# Patient Record
Sex: MALE | Race: WHITE | NOT HISPANIC OR LATINO | Employment: OTHER
[De-identification: names, ages, dates, MRNs, and addresses within clinical notes are randomized per-mention and may not be internally consistent; named-entity substitution may affect disease eponyms.]

---

## 2022-02-23 ENCOUNTER — TRANSCRIBE ORDERS (OUTPATIENT)
Dept: OTHER | Age: 72
End: 2022-02-23

## 2022-02-23 DIAGNOSIS — R33.9 RETENTION OF URINE, UNSPECIFIED: Primary | ICD-10-CM

## 2022-03-01 ENCOUNTER — PRE VISIT (OUTPATIENT)
Dept: UROLOGY | Facility: CLINIC | Age: 72
End: 2022-03-01

## 2022-03-01 NOTE — TELEPHONE ENCOUNTER
MEDICAL RECORDS REQUEST   [facility]  Urology Clinic  [address] [place] [address]  PHONE: [phone]  Fax: [phone]        FUTURE VISIT INFORMATION                                                   Dale Wright, : 1950 scheduled for future visit at [facility]    APPOINTMENT INFORMATION:    Date: 2022    Provider:  Zandra Smiley PA    Reason for Visit/Diagnosis: Retention of urine, unspecified [R33.9]    REFERRAL INFORMATION:    Referring provider:  Amari Pryor    Referring providers clinic:  [facility]    Clinic contact number: Ref fax: [phone]    RECORDS REQUESTED FOR VISIT                                                     NOTES  STATUS/DETAILS   OFFICE NOTE from referring provider Media [facility], under Business/Insurance/ Care Coordination 02/15/2022, 2022 -- Amari Pryor W -- [facility]   MEDICATION LIST  yes, [facility], [facility]   LABS     IMAGING (IMAGES & REPORT)  yes, 02/10/2022 -- MR PELVIS    2021 -- CT ABD/P         PRE-VISIT CHECKLIST      Record collection complete Yes   Appointment appropriately scheduled           (right time/right provider) Yes   Joint diagnostic appointment coordinated correctly          (ensure right order & amount of time) Yes   MyChart activation No   Questionnaire complete If no, please explain pending

## 2022-03-02 ENCOUNTER — PRE VISIT (OUTPATIENT)
Dept: UROLOGY | Facility: CLINIC | Age: 72
End: 2022-03-02

## 2022-03-02 ENCOUNTER — VIRTUAL VISIT (OUTPATIENT)
Dept: UROLOGY | Facility: CLINIC | Age: 72
End: 2022-03-02
Attending: PHYSICIAN ASSISTANT

## 2022-03-02 DIAGNOSIS — Z91.199 NO-SHOW FOR APPOINTMENT: Primary | ICD-10-CM

## 2022-03-02 NOTE — CONFIDENTIAL NOTE
Reason for visit: Consult ([facility]      Relevant information: Urinary retention    Records/imaging/labs/orders: Need more outside records    Pt called: N/A    At Rooming: Video    Action 03/02/2022 JTV 4:06pm   Action Taken Patient records are in Media [facility], scanned on 02/23/2022, under document type: Business/Insurance/Care Coordinator, Doc Description [facility] Medical Doc.

## 2022-03-04 ENCOUNTER — VIRTUAL VISIT (OUTPATIENT)
Dept: UROLOGY | Facility: CLINIC | Age: 72
End: 2022-03-04
Payer: COMMERCIAL

## 2022-03-04 DIAGNOSIS — R33.9 URINARY RETENTION: ICD-10-CM

## 2022-03-04 DIAGNOSIS — N13.8 BENIGN PROSTATIC HYPERPLASIA WITH URINARY OBSTRUCTION: Primary | ICD-10-CM

## 2022-03-04 DIAGNOSIS — N13.30 HYDRONEPHROSIS, UNSPECIFIED HYDRONEPHROSIS TYPE: ICD-10-CM

## 2022-03-04 DIAGNOSIS — N40.1 BENIGN PROSTATIC HYPERPLASIA WITH URINARY OBSTRUCTION: Primary | ICD-10-CM

## 2022-03-04 PROBLEM — R33.8 ACUTE RETENTION OF URINE: Status: ACTIVE | Noted: 2022-03-04

## 2022-03-04 PROBLEM — H16.009 CORNEAL ULCER: Status: ACTIVE | Noted: 2022-03-04

## 2022-03-04 PROBLEM — E83.110 HEREDITARY HEMOCHROMATOSIS (H): Status: ACTIVE | Noted: 2022-03-04

## 2022-03-04 PROBLEM — H40.9 GLAUCOMA: Status: ACTIVE | Noted: 2022-03-04

## 2022-03-04 PROBLEM — G47.33 OBSTRUCTIVE SLEEP APNEA SYNDROME IN ADULT: Status: ACTIVE | Noted: 2022-03-04

## 2022-03-04 PROBLEM — F10.10 ALCOHOL ABUSE: Status: ACTIVE | Noted: 2022-03-04

## 2022-03-04 PROBLEM — I10 BENIGN ESSENTIAL HYPERTENSION: Status: ACTIVE | Noted: 2022-03-04

## 2022-03-04 PROBLEM — R60.0 LOWER LEG EDEMA: Status: ACTIVE | Noted: 2022-03-04

## 2022-03-04 PROBLEM — E66.9 OBESITY: Status: ACTIVE | Noted: 2022-03-04

## 2022-03-04 PROBLEM — E11.9 DIABETES MELLITUS (H): Status: ACTIVE | Noted: 2022-03-04

## 2022-03-04 PROBLEM — N17.9 ACUTE RENAL FAILURE (H): Status: ACTIVE | Noted: 2022-03-04

## 2022-03-04 PROBLEM — R97.20 ELEVATED PSA: Status: ACTIVE | Noted: 2022-03-04

## 2022-03-04 PROBLEM — F43.10 POSTTRAUMATIC STRESS DISORDER: Status: ACTIVE | Noted: 2022-03-04

## 2022-03-04 PROBLEM — E78.5 HYPERLIPIDEMIA: Status: ACTIVE | Noted: 2022-03-04

## 2022-03-04 PROCEDURE — 99214 OFFICE O/P EST MOD 30 MIN: CPT | Mod: GT | Performed by: PHYSICIAN ASSISTANT

## 2022-03-04 RX ORDER — TRIAMCINOLONE ACETONIDE 1 MG/G
CREAM TOPICAL
COMMUNITY
Start: 2021-09-27

## 2022-03-04 RX ORDER — ACYCLOVIR 800 MG/1
400 TABLET ORAL 2 TIMES DAILY
COMMUNITY
Start: 2021-12-01

## 2022-03-04 RX ORDER — LOSARTAN POTASSIUM 100 MG/1
1 TABLET ORAL DAILY
COMMUNITY
Start: 2022-01-19

## 2022-03-04 RX ORDER — CITALOPRAM HYDROBROMIDE 40 MG/1
1 TABLET ORAL EVERY MORNING
COMMUNITY
Start: 2021-09-14

## 2022-03-04 RX ORDER — MIRTAZAPINE 30 MG/1
TABLET, FILM COATED ORAL
COMMUNITY
Start: 2021-03-03

## 2022-03-04 RX ORDER — GLIPIZIDE 5 MG/1
15 TABLET ORAL
COMMUNITY
Start: 2021-12-13

## 2022-03-04 RX ORDER — ATORVASTATIN CALCIUM 20 MG/1
TABLET, FILM COATED ORAL
COMMUNITY
Start: 2021-09-27

## 2022-03-04 RX ORDER — TAMSULOSIN HYDROCHLORIDE 0.4 MG/1
0.4 CAPSULE ORAL DAILY
COMMUNITY
Start: 2022-02-09 | End: 2022-12-13

## 2022-03-04 RX ORDER — CHLORTHALIDONE 25 MG/1
TABLET ORAL
Status: ON HOLD | COMMUNITY
Start: 2021-09-27 | End: 2022-08-30

## 2022-03-04 RX ORDER — FOLIC ACID 1 MG/1
1 TABLET ORAL DAILY
COMMUNITY
Start: 2021-08-18

## 2022-03-04 RX ORDER — NIFEDIPINE 90 MG/1
1 TABLET, FILM COATED, EXTENDED RELEASE ORAL DAILY
COMMUNITY
Start: 2021-12-15

## 2022-03-04 RX ORDER — POTASSIUM CHLORIDE 750 MG/1
1 TABLET, EXTENDED RELEASE ORAL DAILY
COMMUNITY
Start: 2021-09-27

## 2022-03-04 RX ORDER — FLUOCINOLONE ACETONIDE 0.1 MG/ML
SOLUTION TOPICAL
COMMUNITY
Start: 2021-09-27

## 2022-03-04 NOTE — PROGRESS NOTES
Dale is a 71 year old who is being evaluated via a billable video visit.      How would you like to obtain your AVS? Mail a copy  If the video visit is dropped, the invitation should be resent by: Text to cell phone: [phone]  Will anyone else be joining your video visit? No      Video Start Time: 8:41 AM  Video-Visit Details    Type of service:  Video Visit    Video End Time:8:52 AM    Originating Location (pt. Location): Home    Distant Location (provider location):  [facility] UROLOGY [facility]     Platform used for Video Visit: [employer]

## 2022-03-04 NOTE — LETTER
Date:March 4, 2022      Patient was self referred, no letter generated. Do not send.        [facility] Health Information

## 2022-03-04 NOTE — PROGRESS NOTES
Chief Complaint:   Urinary retention          History of Present Illness:   Dale Wright is a 71 year old male with a history of BPH who presents for evaluation of urinary retention.  [facility] with most recent office visit 2/3/2022 with Dr. Pryor.  Patient has a history of urinary retention for which he has required catheterization beginning in 8/2021 with associated hydronephrosis and SAMANTHA.  Patient was performing CIC, but this was subsequently switched back to indwelling decker catheter beginning in 12/2021 after CT abdomen pelvis showed worsening moderate bilateral hydronephrosis with CIC.  Patient continues to have a decker catheter in place with last change 2/13/2022; he is being set up for [facility]  Most recent creatinine 2.3 from 12/27/2021.  He is maintained on tamsulosin.      Recent evaluation with UDS from 1/27/2022 showing obstructive pattern with Pdet Qmax 58 with .  Normal capacity, compliant bladder, no DO/DOI.      Patient also with elevated PSA with PSA 17.86ug/L from 8/17/2021, though decreased back to 11.07ug/L from 12/13/2021.  Recent prostate MRI from 2/10/2022 showing PIRADS 2 with prostate size 212 ml.  Of note, the patient 3 prior TRUS prostate biopsies in the past which were all negative, with the last one ~15 years ago.  He was subsequently referred to community care for evaluation of bladder outlet procedures.            Past Medical History:     Past Medical History:   Diagnosis Date     Arthritis      Corneal ulcer of left eye      Hypertension      Type 2 diabetes mellitus without complications (H)      Unspecified nonsenile cataract     OS            Past Surgical History:     Past Surgical History:   Procedure Laterality Date     C ANESTH,CORNEAL TRANSPLANT  4/16/2013    OS            Medications     Current Outpatient Medications   Medication     acyclovir (ZOVIRAX) 800 MG tablet      aspirin (ASA) 81 MG EC tablet     atorvastatin (LIPITOR) 20 MG tablet     chlorthalidone (HYGROTON) 25 MG tablet     citalopram (CELEXA) 40 MG tablet     dorzolamide-timolol (COSOPT) 2-0.5 % ophthalmic solution     empagliflozin (JARDIANCE) 25 MG TABS tablet     fluocinolone (SYNALAR) 0.01 % solution     folic acid (FOLVITE) 1 MG tablet     glipiZIDE (GLUCOTROL) 5 MG tablet     losartan (COZAAR) 100 MG tablet     mirtazapine (REMERON) 30 MG tablet     NIFEdipine ER (ADALAT CC) 90 MG 24 hr tablet     potassium chloride ER (K-TAB/KLOR-CON) 10 MEQ CR tablet     prednisoLONE acetate (PRED FORTE) 1 % ophthalmic suspension     sterile water, bottle, irrigation     tamsulosin (FLOMAX) 0.4 MG capsule     triamcinolone (KENALOG) 0.1 % external cream     No current facility-administered medications for this visit.            Family History:     Family History   Problem Relation Age of Onset     Hypertension Father      Diabetes Father      Cancer Brother      Glaucoma No family hx of      Macular Degeneration No family hx of             Social History:     Social History     Socioeconomic History     Marital status:      Spouse name: Not on file     Number of children: Not on file     Years of education: Not on file     Highest education level: Not on file   Occupational History     Not on file   Tobacco Use     Smoking status: Never Smoker     Smokeless tobacco: Never Used   Substance and Sexual Activity     Alcohol use: Not on file     Drug use: Not on file     Sexual activity: Not on file   Other Topics Concern     Not on file   Social History Narrative     Not on file     Social Determinants of Health     Financial Resource Strain: Not on file   Food Insecurity: Not on file   Transportation Needs: Not on file   Physical Activity: Not on file   Stress: Not on file   Social Connections: Not on file   Intimate Partner Violence: Not on file   Housing Stability: Not on file            Allergies:   Simvastatin          Review of Systems:  From intake questionnaire   Negative 14 system review except as noted on HPI, nurse's note.         Physical Exam:   Patient is a 71 year old  male    General Appearance Adult: Alert, no acute distress, oriented  Lungs: no respiratory distress, or pursed lip breathing  Heart: No obvious jugular venous distension present  Skin: no suspicious lesions or rashes  Neuro: Alert, oriented, speech and mentation normal  Further examination is deferred due to the nature of our visit.        Labs and Pathology:    I personally reviewed all applicable laboratory data and went over findings with patient  Significant for:    CBC RESULTS:  No results for input(s): WBC, HGB, PLT in the last 82958 hours.     BMP RESULTS:  No results for input(s): NA, POTASSIUM, CHLORIDE, CO2, ANIONGAP, GLC, BUN, CR, GFRESTIMATED, GFRESTBLACK, CHRIS in the last 52564 hours.    UA RESULTS:   No results for input(s): SG, URINEPH, NITRITE, RBCU, WBCU in the last 91148 hours.    PSA RESULTS  No results found for: PSA      Imaging:    I personally reviewed all applicable imaging and went over findings with patient.  Significant for:    No results found for this or any previous visit.             Assessment and Plan:     Assessment: 71 year old male with SAMANTHA and urinary retention secondary to BPH.  Patient managed with indwelling decker catheter due to worsening hydronephrosis with CIC.  UDS showing obstructive pattern, and recent prostate MRI showing PIRADS 2 with 212 ml prostate size; recent PSA 11.07ug/L from 12/13/2021, decreased from 17.86ug/L from 8/17/2021.  We will refer to Dr. Simons or Dr. Dang for cystoscopy and evaluate for bladder outlet procedures.  We will also obtain updated renal US the same date to evaluate for improvement in hydronephrosis with transition to indwelling decker catheter.  Patient will remain on tamsulosin at this time.  Monthly decker catheter exchanges per [facility] RN.      Plan:  - Schedule cystoscopy with  Dr. Simons or Dr. Dang for bladder outlet procedure evaluation.   - Schedule renal US earlier the same date, so results can be reviewed at the time of the visit.       PERLITA Tiwari  Department of Urology

## 2022-03-04 NOTE — LETTER
3/4/2022       RE: Dale Wright  [address] [place] [id number]     Dear Colleague,    Thank you for referring your patient, Dale Wright, to the [facility] [place] at [facility]. Please see a copy of my visit note below.    Dale is a 71 year old who is being evaluated via a billable video visit.      How would you like to obtain your AVS? Mail a copy  If the video visit is dropped, the invitation should be resent by: Text to cell phone: [phone]  Will anyone else be joining your video visit? No      Video Start Time: 8:41 AM  Video-Visit Details    Type of service:  Video Visit    Video End Time:8:52 AM    Originating Location (pt. Location): Home    Distant Location (provider location):  [facility]OLOG[facility] [place]     Platform used for Video Visit: [employer]            Chief Complaint:   Urinary retention          History of Present Illness:   Dale Wright is a 71 year old male with a history of BPH who presents for evaluation of urinary retention.  [facility] with most recent office visit 2/3/2022 with Dr. Pryor.  Patient has a history of urinary retention for which he has required catheterization beginning in 8/2021 with associated hydronephrosis and SAMANTHA.  Patient was performing CIC, but this was subsequently switched back to indwelling decker catheter beginning in 12/2021 after CT abdomen pelvis showed worsening moderate bilateral hydronephrosis with CIC.  Patient continues to have a decker catheter in place with last change 2/13/2022; he is being set up [facility].  Most recent creatinine 2.3 from 12/27/2021.  He is maintained on tamsulosin.      Recent evaluation with UDS from 1/27/2022 showing obstructive pattern with Pdet Qmax 58 with .  Normal capacity, compliant bladder, no DO/DOI.      Patient also with elevated PSA  with PSA 17.86ug/L from 8/17/2021, though decreased back to 11.07ug/L from 12/13/2021.  Recent prostate MRI from 2/10/2022 showing PIRADS 2 with prostate size 212 ml.  Of note, the patient 3 prior TRUS prostate biopsies in the past which were all negative, with the last one ~15 years ago.  He was subsequently referred to community care for evaluation of bladder outlet procedures.            Past Medical History:     Past Medical History:   Diagnosis Date     Arthritis      Corneal ulcer of left eye      Hypertension      Type 2 diabetes mellitus without complications (H)      Unspecified nonsenile cataract     OS            Past Surgical History:     Past Surgical History:   Procedure Laterality Date     C ANESTH,CORNEAL TRANSPLANT  4/16/2013    OS            Medications     Current Outpatient Medications   Medication     acyclovir (ZOVIRAX) 800 MG tablet     aspirin (ASA) 81 MG EC tablet     atorvastatin (LIPITOR) 20 MG tablet     chlorthalidone (HYGROTON) 25 MG tablet     citalopram (CELEXA) 40 MG tablet     dorzolamide-timolol (COSOPT) 2-0.5 % ophthalmic solution     empagliflozin (JARDIANCE) 25 MG TABS tablet     fluocinolone (SYNALAR) 0.01 % solution     folic acid (FOLVITE) 1 MG tablet     glipiZIDE (GLUCOTROL) 5 MG tablet     losartan (COZAAR) 100 MG tablet     mirtazapine (REMERON) 30 MG tablet     NIFEdipine ER (ADALAT CC) 90 MG 24 hr tablet     potassium chloride ER (K-TAB/KLOR-CON) 10 MEQ CR tablet     prednisoLONE acetate (PRED FORTE) 1 % ophthalmic suspension     sterile water, bottle, irrigation     tamsulosin (FLOMAX) 0.4 MG capsule     triamcinolone (KENALOG) 0.1 % external cream     No current facility-administered medications for this visit.            Family History:     Family History   Problem Relation Age of Onset     Hypertension Father      Diabetes Father      Cancer Brother      Glaucoma No family hx of      Macular Degeneration No family hx of             Social History:     Social History      Socioeconomic History     Marital status:      Spouse name: Not on file     Number of children: Not on file     Years of education: Not on file     Highest education level: Not on file   Occupational History     Not on file   Tobacco Use     Smoking status: Never Smoker     Smokeless tobacco: Never Used   Substance and Sexual Activity     Alcohol use: Not on file     Drug use: Not on file     Sexual activity: Not on file   Other Topics Concern     Not on file   Social History Narrative     Not on file     Social Determinants of Health     Financial Resource Strain: Not on file   Food Insecurity: Not on file   Transportation Needs: Not on file   Physical Activity: Not on file   Stress: Not on file   Social Connections: Not on file   Intimate Partner Violence: Not on file   Housing Stability: Not on file            Allergies:   Simvastatin         Review of Systems:  From intake questionnaire   Negative 14 system review except as noted on HPI, nurse's note.         Physical Exam:   Patient is a 71 year old  male    General Appearance Adult: Alert, no acute distress, oriented  Lungs: no respiratory distress, or pursed lip breathing  Heart: No obvious jugular venous distension present  Skin: no suspicious lesions or rashes  Neuro: Alert, oriented, speech and mentation normal  Further examination is deferred due to the nature of our visit.        Labs and Pathology:    I personally reviewed all applicable laboratory data and went over findings with patient  Significant for:    CBC RESULTS:  No results for input(s): WBC, HGB, PLT in the last 68146 hours.     BMP RESULTS:  No results for input(s): NA, POTASSIUM, CHLORIDE, CO2, ANIONGAP, GLC, BUN, CR, GFRESTIMATED, GFRESTBLACK, CHRIS in the last 92988 hours.    UA RESULTS:   No results for input(s): SG, URINEPH, NITRITE, RBCU, WBCU in the last 85574 hours.    PSA RESULTS  No results found for: PSA      Imaging:    I personally reviewed all applicable imaging and  went over findings with patient.  Significant for:    No results found for this or any previous visit.             Assessment and Plan:     Assessment: 71 year old male with SAMANTHA and urinary retention secondary to BPH.  Patient managed with indwelling decker catheter due to worsening hydronephrosis with CIC.  UDS showing obstructive pattern, and recent prostate MRI showing PIRADS 2 with 212 ml prostate size; recent PSA 11.07ug/L from 12/13/2021, decreased from 17.86ug/L from 8/17/2021.  We will refer to Dr. Simons or Dr. Dang for cystoscopy and evaluate for bladder outlet procedures.  We will also obtain updated renal US the same date to evaluate for improvement in hydronephrosis with transition to indwelling decker catheter.  Patient will remain on tamsulosin at this time.  Monthly decker catheter exchanges per [facility] RN.      Plan:  - Schedule cystoscopy with Dr. Simons or Dr. Dang for bladder outlet procedure evaluation.   - Schedule renal US earlier the same date, so results can be reviewed at the time of the visit.       PERLITA Tiwari  Department of Urology       Again, thank you for allowing me to participate in the care of your patient.      Sincerely,    PERLITA Tiwari

## 2022-03-04 NOTE — PATIENT INSTRUCTIONS
UROLOGY CLINIC VISIT PATIENT INSTRUCTIONS    - Schedule cystoscopy with Dr. Simons or Dr. Dang for bladder outlet procedure evaluation.   - Schedule renal US earlier the same date, so results can be reviewed at the time of the visit.     If you have any issues, questions or concerns in the meantime, do not hesitate to contact us at [phone] or via [employer].     It was a pleasure meeting with you today.  Thank you for allowing me and my team the privilege of caring for you today.  YOU are the reason we are here, and I truly hope we provided you with the excellent service you deserve.  Please let us know if there is anything else we can do for you so that we can be sure you are leaving completely satisfied with your care experience.    Zandra Smiley PA-C  Department of Urology

## 2022-04-26 ENCOUNTER — PRE VISIT (OUTPATIENT)
Dept: UROLOGY | Facility: CLINIC | Age: 72
End: 2022-04-26

## 2022-04-26 NOTE — TELEPHONE ENCOUNTER
Reason for visit: Cystoscopy     Dx/Hx/Sx: BPH w/ Urinary Obstruction / Urinary Retention    Records/imaging/labs/orders: In EPIC    At Rooming: paper AUA; possible flow/pvr post-cysto

## 2022-05-03 ENCOUNTER — ANCILLARY PROCEDURE (OUTPATIENT)
Dept: ULTRASOUND IMAGING | Facility: CLINIC | Age: 72
End: 2022-05-03
Attending: PHYSICIAN ASSISTANT
Payer: COMMERCIAL

## 2022-05-03 ENCOUNTER — OFFICE VISIT (OUTPATIENT)
Dept: UROLOGY | Facility: CLINIC | Age: 72
End: 2022-05-03
Payer: COMMERCIAL

## 2022-05-03 VITALS — HEART RATE: 77 BPM | DIASTOLIC BLOOD PRESSURE: 70 MMHG | SYSTOLIC BLOOD PRESSURE: 116 MMHG

## 2022-05-03 DIAGNOSIS — N40.1 BENIGN PROSTATIC HYPERPLASIA WITH URINARY OBSTRUCTION: ICD-10-CM

## 2022-05-03 DIAGNOSIS — R33.9 URINARY RETENTION: ICD-10-CM

## 2022-05-03 DIAGNOSIS — N13.8 BENIGN PROSTATIC HYPERPLASIA WITH URINARY OBSTRUCTION: ICD-10-CM

## 2022-05-03 DIAGNOSIS — N13.30 HYDRONEPHROSIS, UNSPECIFIED HYDRONEPHROSIS TYPE: ICD-10-CM

## 2022-05-03 DIAGNOSIS — R33.9 URINARY RETENTION: Primary | ICD-10-CM

## 2022-05-03 PROCEDURE — 99214 OFFICE O/P EST MOD 30 MIN: CPT | Mod: 25 | Performed by: UROLOGY

## 2022-05-03 PROCEDURE — 52000 CYSTOURETHROSCOPY: CPT | Performed by: UROLOGY

## 2022-05-03 PROCEDURE — 76770 US EXAM ABDO BACK WALL COMP: CPT | Performed by: RADIOLOGY

## 2022-05-03 NOTE — LETTER
May 3, 2022      TO: Dale Wright  [address] [place] [id number]         Dear Mr. Dale Wright,    Dale is a patient under my care. He was seen by me today 05/03/22 for in person care.      Sincerely,      Jacek Simons MD

## 2022-05-03 NOTE — NURSING NOTE
Chief Complaint   Patient presents with     Cystoscopy     Evaluate prostate for BPH - pt has a catheter, history of CIC       Blood pressure 116/70, pulse 77. There is no height or weight on file to calculate BMI.    Patient Active Problem List   Diagnosis     Posttraumatic stress disorder     Obstructive sleep apnea syndrome in adult     Obesity     Lower leg edema     Hyperlipidemia     Hereditary hemochromatosis (H)     Glaucoma     Elevated PSA     Diabetes mellitus (H)     Corneal ulcer     Benign essential hypertension     Alcohol abuse     Acute retention of urine     Acute renal failure (H)       Allergies   Allergen Reactions     Simvastatin        Current Outpatient Medications   Medication Sig Dispense Refill     acyclovir (ZOVIRAX) 800 MG tablet        aspirin (ASA) 81 MG EC tablet Take 1 tablet by mouth daily       atorvastatin (LIPITOR) 20 MG tablet TAKE ONE-HALF TABLET BY MOUTH AT BEDTIME FOR CHOLESTEROL       chlorthalidone (HYGROTON) 25 MG tablet TAKE ONE-HALF TABLET BY MOUTH EVERY DAY FOR BLOOD PRESSURE CONTROL       citalopram (CELEXA) 40 MG tablet Take 1 tablet by mouth every morning       dorzolamide-timolol (COSOPT) 2-0.5 % ophthalmic solution Place 1 drop into both eyes 2 times daily       empagliflozin (JARDIANCE) 25 MG TABS tablet TAKE ONE TABLET BY MOUTH EVERY DAY FOR DIABETES       fluocinolone (SYNALAR) 0.01 % solution APPLY SMALL TOPICALLY TWICE A DAY **EXTERNAL USE ONLY* FOR EAR RASH       folic acid (FOLVITE) 1 MG tablet Take 1 tablet by mouth daily       glipiZIDE (GLUCOTROL) 5 MG tablet TAKE TWO TABLETS BY MOUTH EVERY MORNING AND TAKE TWO AND ONE-HALF TABLETS EVERY EVENING 30 MINUTES BEFORE MEAL       losartan (COZAAR) 100 MG tablet Take 1 tablet by mouth daily       mirtazapine (REMERON) 30 MG tablet TAKE ONE-HALF TABLET BY MOUTH AT BEDTIME FOR DEPRESSION       NIFEdipine ER (ADALAT CC) 90 MG 24 hr tablet Take 1 tablet by mouth daily       potassium chloride ER (K-TAB/KLOR-CON) 10  MEQ CR tablet Take 1 tablet by mouth daily       prednisoLONE acetate (PRED FORTE) 1 % ophthalmic suspension Place 1 drop Into the left eye daily       sterile water, bottle, irrigation USE 10 ML AS NEEDED FOR CATHETER CHANGES       tamsulosin (FLOMAX) 0.4 MG capsule        triamcinolone (KENALOG) 0.1 % external cream APPLY THIN LAYER TOPICALLY TWICE A DAY **AVOID FACE,GROIN & ARMPITS *FOR EXTERNAL USE ONLY** FOR NECK RASH       TURMERIC PO          Social History     Tobacco Use     Smoking status: Never Smoker     Smokeless tobacco: Never Used       Radha Bauer CMA  5/3/2022  1:29 PM

## 2022-05-03 NOTE — LETTER
5/3/2022       RE: Dale Wright  [address] [place] [id number]     Dear Colleague,    Thank you for referring your patient, Dale Wright, to the [facility] [place] at [facility]. Please see a copy of my visit note below.    [facility] COMPREHENSIVE LOWER URINARY TRACT SYMPTOM EVALUATION    Reason for cystoscopy: Urinary Retention    Brief History: [facility]  At the time was diagnosed with hydro/SAMANTHA.  Was on CIC for period of time but had decker replaced/indwelling after some mild hydro noted on a follow-up CT.  Has had VUDS showing functional detrusor.  HAs very large prostate over 200 gm.  Has had elevated PSA for many years with several negative TRUS biopsies.    CYSTOSCOPY  After obtaining informed consent, the patient was prepped and draped in the standard sterile fashion.  The 15 Hungarian flexible cystoscope was inserted through the urethral meatus.      The anterior urethra was:  normal without stricture.    The external sphincter was  appropriately coapted.   The prostatic urethra demonstrated severe trilobar hypertrophy.    The bladder neck was severely occlusive.    The bladder was  unremarkable for tumors, erythema or stones.    The ureteral orifices  were identified on each side in orthotopic position with efflux of clear urine.   There were moderate trabeculations.    On retroflexion there was the usual bladder neck hyperemia.    There was massive intravesical protrusion of the prostate.      The patient tolerated the procedure well without complication.      EVALUATION AND MANAGEMENT   Upon completion of the cystoscopy the patient was brought to a separate examination room to discuss findings of above testing, review available treatment options and make a plan for further steps in care     SUMMARY: 70 yo M with large gland BPH and retention  -Desires definitive  treatment  -Prefers to go back to CIC, instructed that this is ok if he is able to cath reliably with goal outputs < 300 ml per void  -Renal US reviewed today, no evidence of hydro  After discussion of medical and surgical treatments for BPH including alpha blockers, anticholinergics, transurethral resection, laser ablation, enucleation and simple prostatectomy, Mr. Wright has decided to proceed with Holmium Laser Enucleation of the Prostate (HoLEP).    We discussed the associated risks of this procedure included but not limited to the following:  -Bleeding, potentially significant enough to require clot evacuation and blood transfusion  -Infection, for which we will plan to treat preoperatively based on targeted antibiotic therapy  -Damage to the bladder, urethra and penis including the risk of urethral stricture and bladder neck contracture  -Risk of incidentally discovered prostate cancer.  We discussed that this would not preclude him from further therapy though it could prolong recovery and potentially increase risk of complications associated with cancer treatment.  Further preoperative workup to assess for prostate cancer prior to surgery was offered but patient deferred.  -Risk of retrograde ejaculation which would be expected to occur in the majority if not all men after HoLEP  -Risk of urinary incontinence.  We discussed that in the majority of men this is a transient process that is generally self limited to the first 6-12 weeks after surgery though could take longer to resolve depending on baseline bladder instability.  -Risk of postperative urinary retention though in published series HoLEP has been found to be associated with high success rates of achieving spontaneous voiding even in men with underactive and atonic bladders.  -We will proceed with preoperative clearance with preference to minimize all anticoagulation as deemed acceptable by his primary care provider.       >30 minutes spent on the  date of the encounter, including direct interaction with the patient, performing chart review, documentation and further activities as noted above, exclusive of the time spent performing  cystoscopy    I, Jacek Simons saw and evaluated this patient and agree with the plan as stated above.  I personally performed all listed procedures.

## 2022-05-03 NOTE — NURSING NOTE
Invasive Procedure Safety Checklist:    Procedure: Cystoscopy    Action: Complete sections and checkboxes as appropriate.    Pre-procedure:  1. Patient ID Verified with 2 identifiers (Laisha and  or MRN) : YES    2. Procedure and site verified with patient/designee (when able) : YES    3. Accurate consent documentation in medical record : YES    4. H&P (or appropriate assessment) documented in medical record : YES  H&P must be up to 30 days prior to procedure an updated within 24 hours of                 Procedure as applicable.     5. Relevant diagnostic and radiology test results appropriately labeled and displayed as applicable : YES    6. Blood products, implants, devices, and/or special equipment available for the procedure as applicable : YES    7. Procedure site(s) marked with provider initials [Exclusions: None] : NO    8. Marking not required. Reason : Yes  Procedure does not require site marking    Time Out:     Time-Out performed immediately prior to starting procedure, including verbal and active participation of all team members addressing: YES    1. Correct patient identity.  2. Confirmed that the correct side and site are marked.  3. An accurate procedure to be done.  4. Agreement on the procedure to be done.  5. Correct patient position.  6. Relevant images and results are properly labeled and appropriately displayed.  7. The need to administer antibiotics or fluids for irrigation purposes during the procedure as applicable.  8. Safety precautions based on patient history or medication use.    During Procedure: Verification of correct person, site, and procedure occurs any time the responsibility for care of the patient is transferred to another member of the care team.    No medication given during today's procedure.    Radha Bauer CMA  May 3, 2022

## 2022-05-04 NOTE — PROGRESS NOTES
[facility] COMPREHENSIVE LOWER URINARY TRACT SYMPTOM EVALUATION    Reason for cystoscopy: Urinary Retention    Brief History: [facility].  At the time was diagnosed with hydro/SAMANTHA.  Was on CIC for period of time but had decker replaced/indwelling after some mild hydro noted on a follow-up CT.  Has had VUDS showing functional detrusor.  HAs very large prostate over 200 gm.  Has had elevated PSA for many years with several negative TRUS biopsies.    CYSTOSCOPY  After obtaining informed consent, the patient was prepped and draped in the standard sterile fashion.  The 15 Canadian flexible cystoscope was inserted through the urethral meatus.      The anterior urethra was:  normal without stricture.    The external sphincter was  appropriately coapted.   The prostatic urethra demonstrated severe trilobar hypertrophy.    The bladder neck was severely occlusive.    The bladder was  unremarkable for tumors, erythema or stones.    The ureteral orifices  were identified on each side in orthotopic position with efflux of clear urine.   There were moderate trabeculations.    On retroflexion there was the usual bladder neck hyperemia.    There was massive intravesical protrusion of the prostate.      The patient tolerated the procedure well without complication.      EVALUATION AND MANAGEMENT   Upon completion of the cystoscopy the patient was brought to a separate examination room to discuss findings of above testing, review available treatment options and make a plan for further steps in care     SUMMARY: 70 yo M with large gland BPH and retention  -Desires definitive treatment  -Prefers to go back to CIC, instructed that this is ok if he is able to cath reliably with goal outputs < 300 ml per void  -Renal US reviewed today, no evidence of hydro  After discussion of medical and surgical treatments for BPH including alpha blockers, anticholinergics, transurethral  resection, laser ablation, enucleation and simple prostatectomy, Mr. Wright has decided to proceed with Holmium Laser Enucleation of the Prostate (HoLEP).    We discussed the associated risks of this procedure included but not limited to the following:  -Bleeding, potentially significant enough to require clot evacuation and blood transfusion  -Infection, for which we will plan to treat preoperatively based on targeted antibiotic therapy  -Damage to the bladder, urethra and penis including the risk of urethral stricture and bladder neck contracture  -Risk of incidentally discovered prostate cancer.  We discussed that this would not preclude him from further therapy though it could prolong recovery and potentially increase risk of complications associated with cancer treatment.  Further preoperative workup to assess for prostate cancer prior to surgery was offered but patient deferred.  -Risk of retrograde ejaculation which would be expected to occur in the majority if not all men after HoLEP  -Risk of urinary incontinence.  We discussed that in the majority of men this is a transient process that is generally self limited to the first 6-12 weeks after surgery though could take longer to resolve depending on baseline bladder instability.  -Risk of postperative urinary retention though in published series HoLEP has been found to be associated with high success rates of achieving spontaneous voiding even in men with underactive and atonic bladders.  -We will proceed with preoperative clearance with preference to minimize all anticoagulation as deemed acceptable by his primary care provider.       >30 minutes spent on the date of the encounter, including direct interaction with the patient, performing chart review, documentation and further activities as noted above, exclusive of the time spent performing  cystoscopy    IJacek saw and evaluated this patient and agree with the plan as stated above.  I  personally performed all listed procedures.

## 2022-05-05 ENCOUNTER — PATIENT OUTREACH (OUTPATIENT)
Dept: UROLOGY | Facility: CLINIC | Age: 72
End: 2022-05-05

## 2022-05-05 NOTE — TELEPHONE ENCOUNTER
Reached out to pt on behalf of Zandra Smiley to discuss MACARIO results. Informed pt that it is recommended by the provider to move forward with Holep as discussed during 5/3 appointment with Dr. Simons.     Pt stated he understood the plan. Answered all questions. Will continue to follow as needed.

## 2022-05-24 ENCOUNTER — TELEPHONE (OUTPATIENT)
Dept: UROLOGY | Facility: CLINIC | Age: 72
End: 2022-05-24

## 2022-05-24 NOTE — TELEPHONE ENCOUNTER
Elo Juarez RN Bratsch, Angie J, RN  Caller: Unspecified (Today, 10:09 AM)  We always recommend patients start PT prior to surgery. They have a couple visits prior to surgery and usually a couple after to reduce urinary incontinence after     Spoke to pt. Informed pt why PT was ordered. Pt verbalized understanding.  No other questions noted.     Stephanie Christensen RN MSN

## 2022-05-24 NOTE — TELEPHONE ENCOUNTER
[facility] Call Center    Phone Message    May a detailed message be left on voicemail: yes     Reason for Call: Other: Dale is calling wanting to know why he is needing to do PT since he is going to be having surgery and he is just confused about PT. Please give him a call back to discuss, thanks!     Action Taken: Message routed to:  [facility] ([facility] u[facility]    Travel Screening: Not Applicable

## 2022-05-26 ENCOUNTER — TELEPHONE (OUTPATIENT)
Dept: UROLOGY | Facility: CLINIC | Age: 72
End: 2022-05-26

## 2022-05-26 NOTE — CONFIDENTIAL NOTE
Called patient to schedule procedure with Dr. Simons several times on the preferred home phone number and it states that number is disconnected. Tried calling patient's mobile number and the voicemail box is full, there was no answer. Sumi CARTWRIGHT Jenni-Op Coordinator for Urology Surgery

## 2022-06-06 ENCOUNTER — THERAPY VISIT (OUTPATIENT)
Dept: PHYSICAL THERAPY | Facility: CLINIC | Age: 72
End: 2022-06-06
Attending: UROLOGY
Payer: COMMERCIAL

## 2022-06-06 DIAGNOSIS — R33.9 URINARY RETENTION: ICD-10-CM

## 2022-06-06 PROCEDURE — 97112 NEUROMUSCULAR REEDUCATION: CPT | Mod: GP | Performed by: PHYSICAL THERAPIST

## 2022-06-06 PROCEDURE — 97161 PT EVAL LOW COMPLEX 20 MIN: CPT | Mod: GP | Performed by: PHYSICAL THERAPIST

## 2022-06-06 NOTE — PROGRESS NOTES
Physical Therapy Initial Evaluation  Subjective:  The history is provided by the patient.   Patient Health History  Dale Wright being seen for Prostate.          Pain is reported as 0/10 on pain scale.  General health as reported by patient is good.  Pertinent medical history includes: diabetes, high blood pressure, overweight and sleep disorder/apnea.            Current medications:  Anti-depressants and high blood pressure medication.    Current occupation is Retired.   Primary job tasks include:  Lifting/carrying.                                    Objective:  System    Physical Exam    General     ROS    Assessment/Plan:

## 2022-06-06 NOTE — PROGRESS NOTES
Physical Therapy Initial Evaluation  Subjective:  History of urinary retention and difficulty with urinating due to an enlarged prostate for greater than a year. Pt is scheduled for the HoLEP procedure in the future and was referred by MD for instruction in Kegel exercises on 5-4-22      SUBJECTIVE:    Urination:  Do you feel the sensation of your bladder filling? No  How many pads per day are you using? 1 Depends  Do you leak on the way to the bathroom or with a strong urge to void? No  Do you leak with cough,sneeze, jumping, running? No  Do you have triggers that make you feel you can't wait to go to the bathroom? No .  How long can you delay the need to urinate? 3 - 10 minutes.   How many times do you get up to urinate at night? 5x/night  Can you stop the flow of urine when on the toilet? Yes  Is the volume of urine passed usually: small. (8sec rule= 250ml with average bladder storing 400-600ml)  Do you strain to pass urine? No  Do you have a slow or hesitant urinary stream? Yes  Do you have difficulty initiating the urine stream? Yes  Fluid intake(one glass is 8oz or one cup) 8 glasses/day, 3 caffinated glasses/day  0 alcohol glasses/day.        Therapist Generated HPI Evaluation         Type of problem:  Pelvic dysfunction (urinary retention).    This is a chronic condition.  Occurance: enlarged prostate.  Where condition occurred: other.  Patient reports pain:  N/a.      Since onset symptoms are unchanged.  Exacerbated by: needing to urinate.  Relieved by: urinating.  Imaging testing: US see report.  Past treatment: none.   Work activity restrictions: retired.  Barriers include:  None as reported by patient.                        Objective:  System                                 Pelvic Dysfunction Evaluation:      Diagnostic Tests:  Diagnostic tests pelvic: US.                        Flexibility:    Tightness present at:Adductors; Iliopsoas; Hamstrings and Piriformis              Internal Assessment:       Contraction/Grade:  Fair squeeze, definite lift (3)  Accessory Muscle use-Abdominals:  Yes  Accessory Muscle use-Gluteals:  Yes  Accessory Muscle use-Adductors:  Yes    SEMG Biofeedback:    Equipment:  Biofeedback     Suraface electrode placement--Perianal:  Bilateral   Baseline EMG PM:  5 mV    Peak pelvic muscle contraction:  9 mV    EMG interpretation to fatigue:  3-5 seconds  Additional History:        Caffeine Consumption:  3 cups of coffee per day                     General     ROS    Assessment/Plan:    Patient is a 71 year old male with urinary retention/ enlarged prostate complaints.    Patient has the following significant findings with corresponding treatment plan.                Diagnosis 1:  Urinary retention/ enlarged prostate  Decreased strength - therapeutic exercise, therapeutic activities and home program  Impaired muscle performance - biofeedback, neuro re-education and home program    Therapy Evaluation Codes:   1) History comprised of:   Personal factors that impact the plan of care:      None.    Comorbidity factors that impact the plan of care are:      Diabetes, High blood pressure, Overweight and Sleep disorder/apnea.     Medications impacting care: Anti-depressant and High blood pressure.  2) Examination of Body Systems comprised of:   Body structures and functions that impact the plan of care:      Pelvis.   Activity limitations that impact the plan of care are:      urination.  3) Clinical presentation characteristics are:   Stable/Uncomplicated.  4) Decision-Making    Low complexity using standardized patient assessment instrument and/or measureable assessment of functional outcome.  Cumulative Therapy Evaluation is: Low complexity.    Previous and current functional limitations:  (See Goal Flow Sheet for this information)    Short term and Long term goals: (See Goal Flow Sheet for this information)     Communication ability:  Patient appears to be able to clearly communicate and  understand verbal and written communication and follow directions correctly.  Treatment Explanation - The following has been discussed with the patient:   RX ordered/plan of care  Anticipated outcomes  Possible risks and side effects  This patient would benefit from PT intervention to resume normal activities.   Rehab potential is good.    Frequency:  2x/month, once daily  Duration:  for 1 months  Discharge Plan:  Achieve all LTG.  Independent in home treatment program.  Reach maximal therapeutic benefit.    Please refer to the daily flowsheet for treatment today, total treatment time and time spent performing 1:1 timed codes.

## 2022-06-20 ENCOUNTER — THERAPY VISIT (OUTPATIENT)
Dept: PHYSICAL THERAPY | Facility: CLINIC | Age: 72
End: 2022-06-20
Payer: COMMERCIAL

## 2022-06-20 DIAGNOSIS — R33.9 URINARY RETENTION: Primary | ICD-10-CM

## 2022-06-20 PROCEDURE — 97112 NEUROMUSCULAR REEDUCATION: CPT | Mod: GP | Performed by: PHYSICAL THERAPIST

## 2022-06-20 PROCEDURE — 97110 THERAPEUTIC EXERCISES: CPT | Mod: GP | Performed by: PHYSICAL THERAPIST

## 2022-06-20 NOTE — PROGRESS NOTES
Subjective:  HPI  Physical Exam                    Objective:  System    Physical Exam    General     ROS    Assessment/Plan:    DISCHARGE REPORT    Progress reporting period is from 6-6-22 to 6-20-22.       SUBJECTIVE  Subjective changes noted by patient:  Pt notes increased strength in pelvic floor muscles. .       Current pain level is 0/10  .     Previous pain level was  2010  .   Changes in function:  Pt doing well with pelvic floor HEP  Adverse reaction to treatment or activity: None    OBJECTIVE  Changes noted in objective findings:  Pt demonstrates improved Kegel contraction in supine, sitting and standing positions.         ASSESSMENT/PLAN  Updated problem list and treatment plan: Diagnosis 1:  Urinary retention.   Decreased strength - therapeutic exercise, therapeutic activities and home program  Impaired muscle performance - biofeedback, neuro re-education and home program  STG/LTGs have been met or progress has been made towards goals:  Yes,   Assessment of Progress: The patient's condition is improving.  Self Management Plans:  Patient has been instructed in a home treatment program.  I have re-evaluated this patient and find that the nature, scope, duration and intensity of the therapy is appropriate for the medical condition of the patient.  Dale continues to require the following intervention to meet STG and LTG's:  PT intervention is no longer required to meet STG/LTG.    Recommendations:  This patient is ready to be discharged from therapy and continue their home treatment program.    Please refer to the daily flowsheet for treatment today, total treatment time and time spent performing 1:1 timed codes.

## 2022-06-23 NOTE — TELEPHONE ENCOUNTER
Called patient to schedule surgery with Dr. Simons  Date of Surgery: Thurs 08/25/2022  Approximate arrival time given:  No  Location of surgery: [facility]  Pre-Op H&P: [facility]s [facility].     Post-Op Appt Date: 6 weeks with JOSE    Imaging needed:  No  Discussed COVID-19 Testing: Yes     Patient aware that pre-op RN will call 2-3 days prior to surgery with arrival time and instructions Yes     Packet sent out: Yes 06/23/22    Confirmed patients demographics and home number was removed. Patient states that they only have cell phones now and the home number is out of service.     All patients questions were answered and was instructed to review surgical packet and call back with any questions or concerns.       Kathya Graves on 6/23/2022 at 3:09 PM

## 2022-08-12 ENCOUNTER — TRANSFERRED RECORDS (OUTPATIENT)
Dept: HEALTH INFORMATION MANAGEMENT | Facility: CLINIC | Age: 72
End: 2022-08-12

## 2022-08-24 ENCOUNTER — TELEPHONE (OUTPATIENT)
Dept: UROLOGY | Facility: CLINIC | Age: 72
End: 2022-08-24

## 2022-08-24 ENCOUNTER — PATIENT OUTREACH (OUTPATIENT)
Dept: UROLOGY | Facility: CLINIC | Age: 72
End: 2022-08-24

## 2022-08-24 ENCOUNTER — MEDICAL CORRESPONDENCE (OUTPATIENT)
Dept: HEALTH INFORMATION MANAGEMENT | Facility: CLINIC | Age: 72
End: 2022-08-24

## 2022-08-24 DIAGNOSIS — Z01.812 PRE-PROCEDURE LAB EXAM: Primary | ICD-10-CM

## 2022-08-24 DIAGNOSIS — N39.0 URINARY TRACT INFECTION: ICD-10-CM

## 2022-08-24 DIAGNOSIS — R33.9 URINARY RETENTION: Primary | ICD-10-CM

## 2022-08-24 RX ORDER — CIPROFLOXACIN 500 MG/1
500 TABLET, FILM COATED ORAL DAILY
Qty: 6 TABLET | Refills: 0 | Status: ON HOLD | OUTPATIENT
Start: 2022-08-24 | End: 2022-08-30

## 2022-08-24 RX ORDER — CIPROFLOXACIN 500 MG/1
500 TABLET, FILM COATED ORAL ONCE
Qty: 1 TABLET | Refills: 0 | Status: SHIPPED | OUTPATIENT
Start: 2022-08-24 | End: 2022-08-24 | Stop reason: DRUGHIGH

## 2022-08-24 NOTE — TELEPHONE ENCOUNTER
Called patient to confirm that surgery is scheduled for Monday, 8/29/2022 with Dr. Simons at the [facility] confirmed address [address] [place] [address].  Approximate arrival time given to patient of 1100am. Knows that pre-op RN will call Thursday or Friday to confirm arrival and instructions from the hospital.     Patient had questions regarding his antibiotic and when to start. Patient states he has 6 pills wondering if he should start today and if he should take the morning of surgery. Confirmed that his wife has picked up the Rx.     Informed patient that [facility] for [facility]. Patient was happy to hear this and has no further questions. Will wait for RN phone call.      Kathya Graves on 8/24/2022 at 1:13 PM       [Home] : at home, [unfilled] , at the time of the visit. [Medical Office: ([facility])___] : at ~his/her~ medical office located in [place] [Spouse] : spouse [Patient] : the patient [FreeTextEntry1] : 62 -year-old right-handed man who developed difficulty with his left arm and leg about 2017, diagnosed with Parkinson's disease, carried GBA mutation. \par \par 1. On levodopa 25/100 qid now. No longer on rotigotine or rasagiline. Feels a little slower, given the current situation. No falls or limb weakness Goes walking and takes online TaiChi and Boxing. Sometimes with a little trouble getting up at night\par 2. No longer constipated. \par 3. No depression, anxiety, or hallucinations. \par 4. No drooling or dysphagia. \par 5. Generally sleeps well, vivid dreams, no RBD. Mirtazapine 7.5 mg at night helps\par \par \par

## 2022-08-24 NOTE — TELEPHONE ENCOUNTER
----- Message from Elo Juarez RN sent at 8/24/2022  1:40 PM CDT -----  Regarding: fax his covid order please  Please fax a fax cover sheet on the fax and [facility] to [facility], I forget which one. I think you sent a UC order over earlier for him    On the fax cover sheet please write    PT SURGERY HAS BEEN RESCHEDULED FROM 8/25 TO 8/29 AND NEEDS UC AND REPEAT COVID TEST COMPLETED ON 8/25. PLEASE REACH OUT TO PATIENT TO SCHEDULE ASAP, WE REALLY APPRECIATE YOUR ASSISTANCE IN THIS MATTER. ANY QUESTIONS, PLEASE REACH OUT TO NURSE ELO AT THE [facility] .[phone]    To summarize, covid lab and UA/UC lab please for patient on 8/25    THANK YOU

## 2022-08-24 NOTE — TELEPHONE ENCOUNTER
[facility]irmed [facility] for [facility] is [phone]. Orders placed as confirmed with nurse Elo.     Stephanie Christensen RN MSN

## 2022-08-24 NOTE — TELEPHONE ENCOUNTER
Spoke with patient regarding surgery reschedule.   [facility] and repeat covid test. Surgery rescheduled due to patient continuing jardiance, where a three day hold should have taken place.     [facility] to [facility] [place] at [phone] as well as [phone] as requested by [facility]    Pt rescheduled for this coming Monday, 8/29 at [place].    Pre Op Teaching Flowsheet       Pre and Post op Patient Education  Relevant Diagnosis:  bph  Surgical procedure:  holep  Teaching Topic:  Pre and post op teaching  Person Involved in teaching: Yes    Motivation Level:  Asks Questions: Yes  Eager to Learn: Yes  Cooperative: Yes  Receptive (willing/able to accept information):  Yes    Patient demonstrates understanding of the following:  Date of surgery:  8/29  [facility]  History and Physical and any other testing necessary prior to surgery: Yes  Required time line for completion of History and Physical and any pre-op testing: Yes    Patient demonstrates understanding of the following:  Pre-op bowel prep:  N/A  Pre-op showering/scrub information with PCMX Soap: Yes  Blood thinner medications discussed and when to stop (if applicable):  Yes  Discussed no visitor's at this time due to increase Covid-19 cases and how we need to make sure everyone stays safe.    Infection Prevention:   Patient demonstrates understanding of the following:  Surgical procedure site care taught: Yes  Signs and symptoms of infection taught: Yes      Post-op follow-up:  Discussed how to contact the hospital, nurse, and clinic scheduling staff if necessary. (See packet information)    Instructional materials used/given/mailed:  [facility] Packet, post op teaching sheet, Map, Soap, and with the arrival/location information to come closer to the surgery date.    Surgical instructions packet given to patient in office:  N/A    Follow up: Discussed arranging  for someone to drive you home. ( No public transportation)  Someone needed to stay the first twenty hours after surgery: Yes     referral: no     home:  yes    Care Giver:  yes    PCP:  yes

## 2022-08-24 NOTE — TELEPHONE ENCOUNTER
Pt has continued his jardiance, taken daily. Hold time should have been three days. Pt notified and surgery scheduling working on possibility to move to Monday.    Pt advised to take cipro 500 once daily until surgery and [facility] with [facility] today    Will update pt as able

## 2022-08-24 NOTE — TELEPHONE ENCOUNTER
"[facility] Call Center    Phone Message    May a detailed message be left on voicemail: yes     Reason for Call: Order(s): Other:   Reason for requested: UA/UC  Date needed: ASAP  Provider name: Dr. Smions    Pt and Pts wife are extremely upset that \"we screwed up and now the surgery is cancelled and trying to get it rescheduled to Monday as I was supposed to get a call 48 hours prior to surgery and I got a call 24 hours prior\" Pt states Dr. Simons needs a UA/UC done prior to his surgery and [facility] [place]. Pt had not telephone/fax number for the [facility] and is wanting the clinic to get in touch w/ them as 'this is our fault and pt doesn't know what to even do anymore regarding this'    Please call pt once order has been sent and have [facility] reach out to pt once order is received as pt is very upset regarding this. Thanks!      Action Taken: Message routed to:  [facility] ([facility] Uro    Travel Screening: Not Applicable                                                                      "

## 2022-08-24 NOTE — TELEPHONE ENCOUNTER
Please see separate encounter for details. Pt called and relayed all information needed for surgery

## 2022-08-25 ENCOUNTER — TRANSFERRED RECORDS (OUTPATIENT)
Dept: HEALTH INFORMATION MANAGEMENT | Facility: CLINIC | Age: 72
End: 2022-08-25

## 2022-08-25 RX ORDER — SEMAGLUTIDE 1.34 MG/ML
0.5 INJECTION, SOLUTION SUBCUTANEOUS
COMMUNITY

## 2022-08-26 ENCOUNTER — TELEPHONE (OUTPATIENT)
Dept: UROLOGY | Facility: CLINIC | Age: 72
End: 2022-08-26

## 2022-08-26 NOTE — TELEPHONE ENCOUNTER
----- Message from Radha Bauer CMA sent at 8/26/2022 10:03 AM CDT -----  Regarding: FW: Pt has UTI  and surgery on 8/29  Can you get the UC and email it to Kristyn?    Radha  ----- Message -----  From: Jacek Simons MD  Sent: 8/26/2022   9:33 AM CDT  To: Nicole Graves RN, Elo Juarez RN, #  Subject: RE: Pt has UTI  and surgery on 8/29              Can we get the culture in the system ASAP I do not see that it has been uploaded.  I can not determine if appropriately being treated without the culture result.    Darlene/Mary Jo - this is a good example of what we are trying to improve upon.    Cindy Balderrama        ----- Message -----  From: Nicole Graves RN  Sent: 8/26/2022   9:29 AM CDT  To: Elo Juarez RN, Jacek Simons MD  Subject: Pt has UTI  and surgery on 8/29                  Hello -     This patient is scheduled for surgery on Monday, 8/29:    Date:  8/29/22 @ 1:20 pm  Surgeon:  Jacek Simons MD  Procedure:  Holmium Laser Enucleation of the Prostate Choice    The patient just called me to report UA sample came back positive for infection.  He stated these results were faxed to [facility].  He started 500 mg Ciprofloxacin on Wednesday, 8/24, then provided UA on Thursday, 8/25 to the [facility] and just received call from the VA that it is positive for infection.      The [facility] is wondering if he needs additional antibiotics OR continue with current Ciprofloxacin that you prescribed for him that he continues to take.  Can you please reach out to the patient to advise him?  Here is the best phone number for Dale, joel [phone].      Thank you -     CANDIE Rivera   ROD

## 2022-08-26 NOTE — TELEPHONE ENCOUNTER
Action 08/26/22 MMT   Action Taken  CSS faxed urget request to [facility] to fax UA/UC results ASAP.

## 2022-08-29 ENCOUNTER — ANESTHESIA EVENT (OUTPATIENT)
Dept: SURGERY | Facility: CLINIC | Age: 72
DRG: 714 | End: 2022-08-29
Payer: COMMERCIAL

## 2022-08-29 ENCOUNTER — HOSPITAL ENCOUNTER (INPATIENT)
Facility: CLINIC | Age: 72
LOS: 1 days | Discharge: HOME | DRG: 714 | End: 2022-08-30
Attending: UROLOGY | Admitting: UROLOGY
Payer: COMMERCIAL

## 2022-08-29 ENCOUNTER — ANESTHESIA (OUTPATIENT)
Dept: SURGERY | Facility: CLINIC | Age: 72
DRG: 714 | End: 2022-08-29
Payer: COMMERCIAL

## 2022-08-29 DIAGNOSIS — R33.9 RETENTION OF URINE: Primary | ICD-10-CM

## 2022-08-29 DIAGNOSIS — R33.9 URINARY RETENTION: ICD-10-CM

## 2022-08-29 DIAGNOSIS — N39.0 URINARY TRACT INFECTION: ICD-10-CM

## 2022-08-29 LAB
ABO/RH(D): NORMAL
ANION GAP SERPL CALCULATED.3IONS-SCNC: 5 MMOL/L (ref 3–14)
ANION GAP SERPL CALCULATED.3IONS-SCNC: 6 MMOL/L (ref 3–14)
ANTIBODY SCREEN: NEGATIVE
BUN SERPL-MCNC: 34 MG/DL (ref 7–30)
BUN SERPL-MCNC: 34 MG/DL (ref 7–30)
CALCIUM SERPL-MCNC: 8.6 MG/DL (ref 8.5–10.1)
CALCIUM SERPL-MCNC: 9.5 MG/DL (ref 8.5–10.1)
CHLORIDE BLD-SCNC: 112 MMOL/L (ref 94–109)
CHLORIDE BLD-SCNC: 115 MMOL/L (ref 94–109)
CO2 SERPL-SCNC: 19 MMOL/L (ref 20–32)
CO2 SERPL-SCNC: 23 MMOL/L (ref 20–32)
CREAT SERPL-MCNC: 2.09 MG/DL (ref 0.66–1.25)
CREAT SERPL-MCNC: 2.27 MG/DL (ref 0.66–1.25)
ERYTHROCYTE [DISTWIDTH] IN BLOOD BY AUTOMATED COUNT: 12.3 % (ref 10–15)
GFR SERPL CREATININE-BSD FRML MDRD: 30 ML/MIN/1.73M2
GFR SERPL CREATININE-BSD FRML MDRD: 33 ML/MIN/1.73M2
GLUCOSE BLD-MCNC: 156 MG/DL (ref 70–99)
GLUCOSE BLD-MCNC: 207 MG/DL (ref 70–99)
GLUCOSE BLDC GLUCOMTR-MCNC: 163 MG/DL (ref 70–99)
GLUCOSE BLDC GLUCOMTR-MCNC: 168 MG/DL (ref 70–99)
HCT VFR BLD AUTO: 33.6 % (ref 40–53)
HGB BLD-MCNC: 11.5 G/DL (ref 13.3–17.7)
HGB BLD-MCNC: 11.9 G/DL (ref 13.3–17.7)
MCH RBC QN AUTO: 32 PG (ref 26.5–33)
MCHC RBC AUTO-ENTMCNC: 34.2 G/DL (ref 31.5–36.5)
MCV RBC AUTO: 94 FL (ref 78–100)
PLATELET # BLD AUTO: 357 10E3/UL (ref 150–450)
POTASSIUM BLD-SCNC: 4.5 MMOL/L (ref 3.4–5.3)
POTASSIUM BLD-SCNC: 5 MMOL/L (ref 3.4–5.3)
RBC # BLD AUTO: 3.59 10E6/UL (ref 4.4–5.9)
SODIUM SERPL-SCNC: 140 MMOL/L (ref 133–144)
SODIUM SERPL-SCNC: 140 MMOL/L (ref 133–144)
SPECIMEN EXPIRATION DATE: NORMAL
WBC # BLD AUTO: 14.3 10E3/UL (ref 4–11)

## 2022-08-29 PROCEDURE — 999N000141 HC STATISTIC PRE-PROCEDURE NURSING ASSESSMENT: Performed by: UROLOGY

## 2022-08-29 PROCEDURE — 370N000017 HC ANESTHESIA TECHNICAL FEE, PER MIN: Performed by: UROLOGY

## 2022-08-29 PROCEDURE — 250N000009 HC RX 250: Performed by: NURSE ANESTHETIST, CERTIFIED REGISTERED

## 2022-08-29 PROCEDURE — 710N000010 HC RECOVERY PHASE 1, LEVEL 2, PER MIN: Performed by: UROLOGY

## 2022-08-29 PROCEDURE — 82310 ASSAY OF CALCIUM: CPT | Performed by: ANESTHESIOLOGY

## 2022-08-29 PROCEDURE — 86850 RBC ANTIBODY SCREEN: CPT | Performed by: UROLOGY

## 2022-08-29 PROCEDURE — 258N000003 HC RX IP 258 OP 636

## 2022-08-29 PROCEDURE — C1758 CATHETER, URETERAL: HCPCS | Performed by: UROLOGY

## 2022-08-29 PROCEDURE — 360N000077 HC SURGERY LEVEL 4, PER MIN: Performed by: UROLOGY

## 2022-08-29 PROCEDURE — 86901 BLOOD TYPING SEROLOGIC RH(D): CPT | Performed by: UROLOGY

## 2022-08-29 PROCEDURE — 82962 GLUCOSE BLOOD TEST: CPT

## 2022-08-29 PROCEDURE — 85018 HEMOGLOBIN: CPT | Performed by: UROLOGY

## 2022-08-29 PROCEDURE — 272N000001 HC OR GENERAL SUPPLY STERILE: Performed by: UROLOGY

## 2022-08-29 PROCEDURE — 80048 BASIC METABOLIC PNL TOTAL CA: CPT | Performed by: UROLOGY

## 2022-08-29 PROCEDURE — 250N000011 HC RX IP 250 OP 636

## 2022-08-29 PROCEDURE — 52649 PROSTATE LASER ENUCLEATION: CPT | Mod: GC | Performed by: UROLOGY

## 2022-08-29 PROCEDURE — 250N000011 HC RX IP 250 OP 636: Performed by: UROLOGY

## 2022-08-29 PROCEDURE — 250N000011 HC RX IP 250 OP 636: Performed by: NURSE ANESTHETIST, CERTIFIED REGISTERED

## 2022-08-29 PROCEDURE — 88305 TISSUE EXAM BY PATHOLOGIST: CPT | Mod: 26 | Performed by: PATHOLOGY

## 2022-08-29 PROCEDURE — 250N000009 HC RX 250: Performed by: UROLOGY

## 2022-08-29 PROCEDURE — 250N000009 HC RX 250

## 2022-08-29 PROCEDURE — 36415 COLL VENOUS BLD VENIPUNCTURE: CPT | Performed by: ANESTHESIOLOGY

## 2022-08-29 PROCEDURE — 250N000025 HC SEVOFLURANE, PER MIN: Performed by: UROLOGY

## 2022-08-29 PROCEDURE — 85027 COMPLETE CBC AUTOMATED: CPT | Performed by: UROLOGY

## 2022-08-29 PROCEDURE — 88305 TISSUE EXAM BY PATHOLOGIST: CPT | Mod: TC | Performed by: UROLOGY

## 2022-08-29 PROCEDURE — 250N000013 HC RX MED GY IP 250 OP 250 PS 637: Performed by: ANESTHESIOLOGY

## 2022-08-29 PROCEDURE — 36415 COLL VENOUS BLD VENIPUNCTURE: CPT | Performed by: UROLOGY

## 2022-08-29 PROCEDURE — 120N000002 HC R&B MED SURG/OB UMMC

## 2022-08-29 PROCEDURE — 0V508ZZ DESTRUCTION OF PROSTATE, VIA NATURAL OR ARTIFICIAL OPENING ENDOSCOPIC: ICD-10-PCS | Performed by: UROLOGY

## 2022-08-29 PROCEDURE — 250N000009 HC RX 250: Performed by: STUDENT IN AN ORGANIZED HEALTH CARE EDUCATION/TRAINING PROGRAM

## 2022-08-29 PROCEDURE — 258N000003 HC RX IP 258 OP 636: Performed by: STUDENT IN AN ORGANIZED HEALTH CARE EDUCATION/TRAINING PROGRAM

## 2022-08-29 RX ORDER — ATROPA BELLADONNA AND OPIUM 16.2; 3 MG/1; MG/1
30 SUPPOSITORY RECTAL EVERY 12 HOURS PRN
Status: DISCONTINUED | OUTPATIENT
Start: 2022-08-29 | End: 2022-08-30 | Stop reason: HOSPADM

## 2022-08-29 RX ORDER — CEFTRIAXONE 1 G/1
1 INJECTION, POWDER, FOR SOLUTION INTRAMUSCULAR; INTRAVENOUS EVERY 24 HOURS
Status: DISCONTINUED | OUTPATIENT
Start: 2022-08-29 | End: 2022-08-29 | Stop reason: HOSPADM

## 2022-08-29 RX ORDER — NALOXONE HYDROCHLORIDE 0.4 MG/ML
0.2 INJECTION, SOLUTION INTRAMUSCULAR; INTRAVENOUS; SUBCUTANEOUS
Status: DISCONTINUED | OUTPATIENT
Start: 2022-08-29 | End: 2022-08-30 | Stop reason: HOSPADM

## 2022-08-29 RX ORDER — OXYCODONE HYDROCHLORIDE 5 MG/1
5 TABLET ORAL EVERY 4 HOURS PRN
Status: DISCONTINUED | OUTPATIENT
Start: 2022-08-29 | End: 2022-08-29 | Stop reason: HOSPADM

## 2022-08-29 RX ORDER — NIFEDIPINE 30 MG/1
90 TABLET, EXTENDED RELEASE ORAL DAILY
Status: DISCONTINUED | OUTPATIENT
Start: 2022-08-30 | End: 2022-08-30 | Stop reason: HOSPADM

## 2022-08-29 RX ORDER — SODIUM CHLORIDE 9 MG/ML
INJECTION, SOLUTION INTRAVENOUS CONTINUOUS
Status: DISCONTINUED | OUTPATIENT
Start: 2022-08-29 | End: 2022-08-30 | Stop reason: HOSPADM

## 2022-08-29 RX ORDER — ATORVASTATIN CALCIUM 20 MG/1
20 TABLET, FILM COATED ORAL DAILY
Status: DISCONTINUED | OUTPATIENT
Start: 2022-08-30 | End: 2022-08-29 | Stop reason: DRUGHIGH

## 2022-08-29 RX ORDER — ONDANSETRON 4 MG/1
4 TABLET, ORALLY DISINTEGRATING ORAL EVERY 6 HOURS PRN
Status: DISCONTINUED | OUTPATIENT
Start: 2022-08-29 | End: 2022-08-30 | Stop reason: HOSPADM

## 2022-08-29 RX ORDER — NALOXONE HYDROCHLORIDE 0.4 MG/ML
0.4 INJECTION, SOLUTION INTRAMUSCULAR; INTRAVENOUS; SUBCUTANEOUS
Status: DISCONTINUED | OUTPATIENT
Start: 2022-08-29 | End: 2022-08-30 | Stop reason: HOSPADM

## 2022-08-29 RX ORDER — CIPROFLOXACIN 500 MG/1
500 TABLET, FILM COATED ORAL DAILY
Status: DISCONTINUED | OUTPATIENT
Start: 2022-08-30 | End: 2022-08-30 | Stop reason: HOSPADM

## 2022-08-29 RX ORDER — SODIUM CHLORIDE, SODIUM LACTATE, POTASSIUM CHLORIDE, CALCIUM CHLORIDE 600; 310; 30; 20 MG/100ML; MG/100ML; MG/100ML; MG/100ML
INJECTION, SOLUTION INTRAVENOUS CONTINUOUS
Status: DISCONTINUED | OUTPATIENT
Start: 2022-08-29 | End: 2022-08-29 | Stop reason: HOSPADM

## 2022-08-29 RX ORDER — PROCHLORPERAZINE MALEATE 5 MG
5 TABLET ORAL EVERY 6 HOURS PRN
Status: DISCONTINUED | OUTPATIENT
Start: 2022-08-29 | End: 2022-08-30 | Stop reason: HOSPADM

## 2022-08-29 RX ORDER — VANCOMYCIN HYDROCHLORIDE 1 G/200ML
1000 INJECTION, SOLUTION INTRAVENOUS ONCE
Status: COMPLETED | OUTPATIENT
Start: 2022-08-29 | End: 2022-08-29

## 2022-08-29 RX ORDER — FENTANYL CITRATE 50 UG/ML
25 INJECTION, SOLUTION INTRAMUSCULAR; INTRAVENOUS
Status: DISCONTINUED | OUTPATIENT
Start: 2022-08-29 | End: 2022-08-29 | Stop reason: HOSPADM

## 2022-08-29 RX ORDER — ONDANSETRON 4 MG/1
4 TABLET, ORALLY DISINTEGRATING ORAL EVERY 30 MIN PRN
Status: DISCONTINUED | OUTPATIENT
Start: 2022-08-29 | End: 2022-08-29 | Stop reason: HOSPADM

## 2022-08-29 RX ORDER — ONDANSETRON 2 MG/ML
4 INJECTION INTRAMUSCULAR; INTRAVENOUS EVERY 30 MIN PRN
Status: DISCONTINUED | OUTPATIENT
Start: 2022-08-29 | End: 2022-08-29 | Stop reason: HOSPADM

## 2022-08-29 RX ORDER — TRANEXAMIC ACID 10 MG/ML
1 INJECTION, SOLUTION INTRAVENOUS ONCE
Status: COMPLETED | OUTPATIENT
Start: 2022-08-29 | End: 2022-08-29

## 2022-08-29 RX ORDER — ATORVASTATIN CALCIUM 10 MG/1
10 TABLET, FILM COATED ORAL DAILY
Status: DISCONTINUED | OUTPATIENT
Start: 2022-08-30 | End: 2022-08-30 | Stop reason: HOSPADM

## 2022-08-29 RX ORDER — FENTANYL CITRATE 50 UG/ML
25 INJECTION, SOLUTION INTRAMUSCULAR; INTRAVENOUS EVERY 5 MIN PRN
Status: DISCONTINUED | OUTPATIENT
Start: 2022-08-29 | End: 2022-08-29 | Stop reason: HOSPADM

## 2022-08-29 RX ORDER — LIDOCAINE 40 MG/G
CREAM TOPICAL
Status: DISCONTINUED | OUTPATIENT
Start: 2022-08-29 | End: 2022-08-30 | Stop reason: HOSPADM

## 2022-08-29 RX ORDER — DEXAMETHASONE SODIUM PHOSPHATE 4 MG/ML
INJECTION, SOLUTION INTRA-ARTICULAR; INTRALESIONAL; INTRAMUSCULAR; INTRAVENOUS; SOFT TISSUE PRN
Status: DISCONTINUED | OUTPATIENT
Start: 2022-08-29 | End: 2022-08-29

## 2022-08-29 RX ORDER — ACETAMINOPHEN 325 MG/1
975 TABLET ORAL EVERY 8 HOURS
Status: DISCONTINUED | OUTPATIENT
Start: 2022-08-29 | End: 2022-08-30 | Stop reason: HOSPADM

## 2022-08-29 RX ORDER — MIRTAZAPINE 30 MG/1
30 TABLET, FILM COATED ORAL AT BEDTIME
Status: DISCONTINUED | OUTPATIENT
Start: 2022-08-29 | End: 2022-08-29 | Stop reason: CLARIF

## 2022-08-29 RX ORDER — PROPOFOL 10 MG/ML
INJECTION, EMULSION INTRAVENOUS PRN
Status: DISCONTINUED | OUTPATIENT
Start: 2022-08-29 | End: 2022-08-29

## 2022-08-29 RX ORDER — HYDROMORPHONE HCL IN WATER/PF 6 MG/30 ML
0.2 PATIENT CONTROLLED ANALGESIA SYRINGE INTRAVENOUS
Status: DISCONTINUED | OUTPATIENT
Start: 2022-08-29 | End: 2022-08-30 | Stop reason: HOSPADM

## 2022-08-29 RX ORDER — LIDOCAINE 40 MG/G
CREAM TOPICAL
Status: DISCONTINUED | OUTPATIENT
Start: 2022-08-29 | End: 2022-08-29 | Stop reason: HOSPADM

## 2022-08-29 RX ORDER — LIDOCAINE HYDROCHLORIDE 20 MG/ML
INJECTION, SOLUTION INFILTRATION; PERINEURAL PRN
Status: DISCONTINUED | OUTPATIENT
Start: 2022-08-29 | End: 2022-08-29

## 2022-08-29 RX ORDER — CALCIUM CARBONATE 500 MG/1
500 TABLET, CHEWABLE ORAL 4 TIMES DAILY PRN
Status: DISCONTINUED | OUTPATIENT
Start: 2022-08-29 | End: 2022-08-30 | Stop reason: HOSPADM

## 2022-08-29 RX ORDER — AMOXICILLIN 250 MG
1 CAPSULE ORAL 2 TIMES DAILY
Status: DISCONTINUED | OUTPATIENT
Start: 2022-08-29 | End: 2022-08-30 | Stop reason: HOSPADM

## 2022-08-29 RX ORDER — CITALOPRAM HYDROBROMIDE 10 MG/1
40 TABLET ORAL EVERY MORNING
Status: DISCONTINUED | OUTPATIENT
Start: 2022-08-30 | End: 2022-08-30 | Stop reason: HOSPADM

## 2022-08-29 RX ORDER — ACETAMINOPHEN 325 MG/1
975 TABLET ORAL ONCE
Status: COMPLETED | OUTPATIENT
Start: 2022-08-29 | End: 2022-08-29

## 2022-08-29 RX ORDER — ACETAMINOPHEN 325 MG/1
650 TABLET ORAL EVERY 4 HOURS PRN
Status: DISCONTINUED | OUTPATIENT
Start: 2022-09-01 | End: 2022-08-30 | Stop reason: HOSPADM

## 2022-08-29 RX ORDER — HYDROMORPHONE HYDROCHLORIDE 1 MG/ML
0.2 INJECTION, SOLUTION INTRAMUSCULAR; INTRAVENOUS; SUBCUTANEOUS EVERY 5 MIN PRN
Status: DISCONTINUED | OUTPATIENT
Start: 2022-08-29 | End: 2022-08-29 | Stop reason: HOSPADM

## 2022-08-29 RX ORDER — FENTANYL CITRATE 50 UG/ML
INJECTION, SOLUTION INTRAMUSCULAR; INTRAVENOUS PRN
Status: DISCONTINUED | OUTPATIENT
Start: 2022-08-29 | End: 2022-08-29

## 2022-08-29 RX ORDER — ONDANSETRON 2 MG/ML
4 INJECTION INTRAMUSCULAR; INTRAVENOUS EVERY 6 HOURS PRN
Status: DISCONTINUED | OUTPATIENT
Start: 2022-08-29 | End: 2022-08-30 | Stop reason: HOSPADM

## 2022-08-29 RX ORDER — MEPERIDINE HYDROCHLORIDE 25 MG/ML
12.5 INJECTION INTRAMUSCULAR; INTRAVENOUS; SUBCUTANEOUS
Status: DISCONTINUED | OUTPATIENT
Start: 2022-08-29 | End: 2022-08-29 | Stop reason: HOSPADM

## 2022-08-29 RX ORDER — BISACODYL 10 MG
10 SUPPOSITORY, RECTAL RECTAL DAILY PRN
Status: DISCONTINUED | OUTPATIENT
Start: 2022-08-29 | End: 2022-08-30 | Stop reason: HOSPADM

## 2022-08-29 RX ORDER — SODIUM CHLORIDE, SODIUM LACTATE, POTASSIUM CHLORIDE, CALCIUM CHLORIDE 600; 310; 30; 20 MG/100ML; MG/100ML; MG/100ML; MG/100ML
INJECTION, SOLUTION INTRAVENOUS CONTINUOUS PRN
Status: DISCONTINUED | OUTPATIENT
Start: 2022-08-29 | End: 2022-08-29

## 2022-08-29 RX ORDER — PREDNISOLONE ACETATE 10 MG/ML
1 SUSPENSION/ DROPS OPHTHALMIC DAILY
Status: DISCONTINUED | OUTPATIENT
Start: 2022-08-30 | End: 2022-08-30 | Stop reason: HOSPADM

## 2022-08-29 RX ORDER — DORZOLAMIDE HYDROCHLORIDE AND TIMOLOL MALEATE 20; 5 MG/ML; MG/ML
1 SOLUTION/ DROPS OPHTHALMIC 2 TIMES DAILY
Status: DISCONTINUED | OUTPATIENT
Start: 2022-08-29 | End: 2022-08-30 | Stop reason: HOSPADM

## 2022-08-29 RX ORDER — POLYETHYLENE GLYCOL 3350 17 G/17G
17 POWDER, FOR SOLUTION ORAL DAILY
Status: DISCONTINUED | OUTPATIENT
Start: 2022-08-30 | End: 2022-08-30 | Stop reason: HOSPADM

## 2022-08-29 RX ORDER — OXYCODONE HYDROCHLORIDE 5 MG/1
5 TABLET ORAL EVERY 4 HOURS PRN
Status: DISCONTINUED | OUTPATIENT
Start: 2022-08-29 | End: 2022-08-30 | Stop reason: HOSPADM

## 2022-08-29 RX ORDER — ONDANSETRON 2 MG/ML
INJECTION INTRAMUSCULAR; INTRAVENOUS PRN
Status: DISCONTINUED | OUTPATIENT
Start: 2022-08-29 | End: 2022-08-29

## 2022-08-29 RX ORDER — EPHEDRINE SULFATE 50 MG/ML
INJECTION, SOLUTION INTRAMUSCULAR; INTRAVENOUS; SUBCUTANEOUS PRN
Status: DISCONTINUED | OUTPATIENT
Start: 2022-08-29 | End: 2022-08-29

## 2022-08-29 RX ADMIN — Medication 5 MG: at 14:24

## 2022-08-29 RX ADMIN — ACETAMINOPHEN 975 MG: 325 TABLET, FILM COATED ORAL at 12:44

## 2022-08-29 RX ADMIN — SODIUM CHLORIDE, POTASSIUM CHLORIDE, SODIUM LACTATE AND CALCIUM CHLORIDE: 600; 310; 30; 20 INJECTION, SOLUTION INTRAVENOUS at 14:08

## 2022-08-29 RX ADMIN — ATROPA BELLADONNA AND OPIUM 1 SUPPOSITORY: 16.2; 3 SUPPOSITORY RECTAL at 17:55

## 2022-08-29 RX ADMIN — TRANEXAMIC ACID 1 G: 10 INJECTION, SOLUTION INTRAVENOUS at 18:07

## 2022-08-29 RX ADMIN — Medication 2.5 MG: at 16:04

## 2022-08-29 RX ADMIN — Medication 5 MG: at 14:27

## 2022-08-29 RX ADMIN — SODIUM CHLORIDE: 9 INJECTION, SOLUTION INTRAVENOUS at 23:20

## 2022-08-29 RX ADMIN — VANCOMYCIN HYDROCHLORIDE 1 G: 1 INJECTION, SOLUTION INTRAVENOUS at 14:28

## 2022-08-29 RX ADMIN — PROPOFOL 200 MG: 10 INJECTION, EMULSION INTRAVENOUS at 14:11

## 2022-08-29 RX ADMIN — SODIUM CHLORIDE, POTASSIUM CHLORIDE, SODIUM LACTATE AND CALCIUM CHLORIDE: 600; 310; 30; 20 INJECTION, SOLUTION INTRAVENOUS at 16:49

## 2022-08-29 RX ADMIN — FENTANYL CITRATE 50 MCG: 50 INJECTION, SOLUTION INTRAMUSCULAR; INTRAVENOUS at 14:11

## 2022-08-29 RX ADMIN — CEFTRIAXONE 1 G: 1 INJECTION, POWDER, FOR SOLUTION INTRAMUSCULAR; INTRAVENOUS at 14:07

## 2022-08-29 RX ADMIN — PROPOFOL 60 MG: 10 INJECTION, EMULSION INTRAVENOUS at 14:13

## 2022-08-29 RX ADMIN — FENTANYL CITRATE 50 MCG: 50 INJECTION, SOLUTION INTRAMUSCULAR; INTRAVENOUS at 15:04

## 2022-08-29 RX ADMIN — LIDOCAINE HYDROCHLORIDE 100 MG: 20 INJECTION, SOLUTION INFILTRATION; PERINEURAL at 14:11

## 2022-08-29 RX ADMIN — HYDROMORPHONE HYDROCHLORIDE 0.5 MG: 1 INJECTION, SOLUTION INTRAMUSCULAR; INTRAVENOUS; SUBCUTANEOUS at 15:26

## 2022-08-29 RX ADMIN — DEXAMETHASONE SODIUM PHOSPHATE 4 MG: 4 INJECTION, SOLUTION INTRAMUSCULAR; INTRAVENOUS at 14:32

## 2022-08-29 RX ADMIN — ONDANSETRON 4 MG: 2 INJECTION INTRAMUSCULAR; INTRAVENOUS at 17:01

## 2022-08-29 ASSESSMENT — ACTIVITIES OF DAILY LIVING (ADL)
DRESSING/BATHING_DIFFICULTY: NO
CHANGE_IN_FUNCTIONAL_STATUS_SINCE_ONSET_OF_CURRENT_ILLNESS/INJURY: NO
FALL_HISTORY_WITHIN_LAST_SIX_MONTHS: NO
DIFFICULTY_EATING/SWALLOWING: NO
TOILETING_ISSUES: NO
DOING_ERRANDS_INDEPENDENTLY_DIFFICULTY: NO
CONCENTRATING,_REMEMBERING_OR_MAKING_DECISIONS_DIFFICULTY: NO
ADLS_ACUITY_SCORE: 33
ADLS_ACUITY_SCORE: 20
ADLS_ACUITY_SCORE: 35
ADLS_ACUITY_SCORE: 33
WEAR_GLASSES_OR_BLIND: YES
ADLS_ACUITY_SCORE: 33
WALKING_OR_CLIMBING_STAIRS_DIFFICULTY: NO
ADLS_ACUITY_SCORE: 33

## 2022-08-29 NOTE — ANESTHESIA CARE TRANSFER NOTE
Patient: Dale Wright    Procedure: Procedure(s):  Holmium Laser Enucleation of the Prostate       Diagnosis: Urinary retention [R33.9]  Diagnosis Additional Information: No value filed.    Anesthesia Type:   General     Note:    Oropharynx: oral airway in place and spontaneously breathing  Level of Consciousness: iatrogenic sedation  Oxygen Supplementation: face mask  Level of Supplemental Oxygen (L/min / FiO2): 8  Independent Airway: airway patency satisfactory and stable  Dentition: dentition unchanged  Vital Signs Stable: post-procedure vital signs reviewed and stable  Report to RN Given: handoff report given  Patient transferred to: PACU    Handoff Report: Identifed the Patient, Identified the Reponsible Provider, Reviewed the pertinent medical history, Discussed the surgical course, Reviewed Intra-OP anesthesia mangement and issues during anesthesia, Set expectations for post-procedure period and Allowed opportunity for questions and acknowledgement of understanding      Vitals:  Vitals Value Taken Time   BP 94/55 08/29/22 1719   Temp     Pulse 65 08/29/22 1726   Resp 14 08/29/22 1726   SpO2 98 % 08/29/22 1726   Vitals shown include unvalidated device data.    Electronically Signed By: OUMOU Mcmahan CRNA  August 29, 2022  5:26 PM

## 2022-08-29 NOTE — ANESTHESIA PROCEDURE NOTES
Airway       Patient location during procedure: OR  Staff -        CRNA: Colin Syed APRN CRNA       Other Anesthesia Staff: Emily Villasenor       Performed By: SRNA  Consent for Airway        Urgency: elective  Indications and Patient Condition       Indications for airway management: cam-procedural       Induction type:intravenous       Mask difficulty assessment: 1 - vent by mask    Final Airway Details       Final airway type: supraglottic airway    Supraglottic Airway Details        Type: LMA       Brand: Air-Q       LMA size: 4.5    Post intubation assessment        Placement verified by: capnometry, equal breath sounds and chest rise        Number of attempts at approach: 1       Number of other approaches attempted: 0       Secured with: silk tape       Ease of procedure: easy       Dentition: Intact and Unchanged

## 2022-08-29 NOTE — OP NOTE
Operative Report  8/29/2022    PREOPERATIVE DIAGNOSIS:  Prostatic hypertrophy with urinary retention  POSTOPERATIVE DIAGNOSIS: Same as above    PROCEDURE PERFORMED: Holmium laser enucleation of the prostate  ATTENDING SURGEON: Jacek Simons MD  RESIDENT SURGEON: Huang Marti MD    FINDINGS: Approximately 200 gm prostate with trilobar hypertrophy. Bladder with moderate trabeculation  ANESTHESIA: General.   INTRAVENOUS FLUIDS: See anesthesia records  ESTIMATED BLOOD LOSS: 300 ml   SPECIMENS: Prostate adenoma  DRAINS: 22-Stateless 3-way catheter with 70 ml in balloon     INDICATIONS FOR PROCEDURE: Dale Wright is a(n) 71 year old male who was seen in consultation for urinary retention. He had failed optimal medical therapy with tamsulosin/finasteride. Prostate volume estimated to be >200 grams. His digital rectal exam was negative for any nodules. After discussion of the risks, benefits and alternatives of the procedure, the patient agreed to proceed with the above stated procdure.    DESCRIPTION OF PROCEDURE: After obtaining informed consent, the patient was taken to the operating room and placed under general anesthesia.  He was repositioned in dorsal lithotomy making sure that the legs were positioned and padded safely.  He was then prepped and draped in standard sterile fashion.  Culture sensitive antibiotics were administered and bilateral sequential compression devices were placed.  A time out was performed confirming the appropriate patient identity and planned procedure.     The procedure was begun by generously lubricating the urethra.  We then sequentially dilated the urethra using the lindsey sounds from 22-30F.  The urethra was noted to be snug but sufficient for placement of the scope.       Next we inserted the 26F outer sheath over a deflecting obturator and looked the scope through the posterior urethra and into the bladder.  The prostate was noted to have a trilobar configuration.  It was massive  and required significant maneuvering to get over the bladder neck. We inspected the bladder noting that it had moderate trabeculation.  At this point we inserted the 550 micron holmium laser through the 7F laser catheter.    We began enucleation by making a 5 o'clockincision. We carried this incision down to the prostatic capsule from the bladder neck towards the apex just proximal to the veromontanum. We then proceeded with a three lobe approach, electing to enucleate the medianlobe first. We performed the majority of the dissection at 40 sosa making sure to keep the energy at 40 sosa or lower when working near the apex. The capsular planes on this side were noted to be excellent. Once the majority of the lobe was dissected we isolated the apical strip and transected it with the laser at 40 sosa. We then proceeded to push it into the bladder and transect the bladder neck attachments. Next we performed and anterior commisurotomy and proceeded with enucleation of the left lobe from a top down approach. Planes were excellent. We identified the left mucosal strip and transected it. We then tookdown the remaining lateral and posterior attachments and pushed the lobe into the bladder. The remaining bladder neck attachments were identified and transected, freeing the lobe up entirely. With the first twolobes enucleated we then turned our attention to the contralateral side and enucleated the remaining lobe with a top down approach. The capsular planes on this side were excellent. We isolated the mucosal strip and transected it at 40 sosa. We then took down the remaining lateral and posterior attachments and pushed the lobe into the bladder. The remaining bladder neck attachments were identified and transected, freeing the lobe up entirely. Total enucleation time was 95 minutes.     At this point there was a moderate amount of bleeding and approximately 30 minutes were spent identifying bleeding vessels in the fossa  and coagulating them with the laser.  Once hemostasis was adequate we switched from the laser resectoscope to the 26F offset telescope with the Piranha morcellation device.  We added an extra inflow to distend the bladder.  The blades were adjusted and set at a rate of 1500 RPM.  We proceeded with morcellation making sure that we morcellated the entirety of the adenoma.  Total morcellation time was 31 minutes.     We then switched back to the laser resectoscope one final time to ensure that no residual tissue was left in the bladder.  We also confirmed that the bladder was unharmed from morcellation and that both ureteral orifices were unharmed during the procedure.  We inspected the fossa and obtained final hemostasis.  We did not elect to perform a bladder neck incision at 6 o'clock as the prostate was noted to be large. We looked the scope out noting that the sphincter was completely intact without evidence of thermal or mechanical injury.     We lubricated the urethra again and passed a 22F 3-way decker catheter over a catheter guide.  The urine was noted to be clear pink.  We filled the balloon with 70 ml of sterile water and DID place the catheter on traction.  The patient was woken from anesthesia and taken to the recovery room in stable condition.     The specimen was weighed and found to be approximately 171 g.     POSTOPERATIVE PLAN:   - Observe him overnight on continuous bladder irrigation.   - Trial of void in the morning  - Cipro x3 days    Huang Marti MD  Urology PGY2    I, Jacek Simons, was present and participatory for the entirety of the procedure

## 2022-08-29 NOTE — BRIEF OP NOTE
[facility]    Brief Operative Note    Pre-operative diagnosis: Urinary retention [R33.9]  Post-operative diagnosis Same as pre-operative diagnosis    Procedure: Procedure(s):  Holmium Laser Enucleation of the Prostate  Surgeon: Surgeon(s) and Role:     * Jaeck Simons MD - Primary     * Huang Marti MD - Resident - Assisting  Anesthesia: Choice   Estimated Blood Loss: 300 ml    Drains: 22Fr 3-way decker catheter with 70cc in the balloon   Specimens:   ID Type Source Tests Collected by Time Destination   1 :  Tissue Prostate SURGICAL PATHOLOGY EXAM Jacek Simons MD 8/29/2022  2:33 PM      Findings:   None.  Complications: None.  Implants: * No implants in log *

## 2022-08-29 NOTE — OR NURSING
Assumed patient care at 1800,  at bedside manually irrigating decker catheter to light to medium cherry color noted, no clots - output was thick/marroon, bloody. Manual irrigation & tension on catheter by MD. Will continue to monitor patient closely. LOC intact, color pale pink.

## 2022-08-29 NOTE — ANESTHESIA PREPROCEDURE EVALUATION
Anesthesia Pre-Procedure Evaluation    Patient: Dale Wright   MRN: [id number] : 1950        Procedure : Procedure(s):  Holmium Laser Enucleation of the Prostate          Past Medical History:   Diagnosis Date     Arthritis      Corneal ulcer of left eye      Hypertension      Type 2 diabetes mellitus without complications (H)      Unspecified nonsenile cataract     OS      Past Surgical History:   Procedure Laterality Date     ZZC ANESTH,CORNEAL TRANSPLANT  2013    OS      Allergies   Allergen Reactions     Simvastatin       Social History     Tobacco Use     Smoking status: Never Smoker     Smokeless tobacco: Never Used   Substance Use Topics     Alcohol use: Yes     Comment: 1 can of beer every other week or so      Wt Readings from Last 1 Encounters:   22 128.3 kg (282 lb 13.6 oz)        Anesthesia Evaluation   Pt has had prior anesthetic.     No history of anesthetic complications       ROS/MED HX  ENT/Pulmonary:     (+) sleep apnea,     Neurologic:  - neg neurologic ROS     Cardiovascular:     (+) Dyslipidemia hypertension-----    METS/Exercise Tolerance: >4 METS    Hematologic:  - neg hematologic  ROS  (-) history of blood clots   Musculoskeletal:   (+) arthritis,     GI/Hepatic:  - neg GI/hepatic ROS     Renal/Genitourinary:     (+) renal disease, type: CRI, Pt does not require dialysis, BPH,     Endo:     (+) type II DM, Obesity,     Psychiatric/Substance Use:     (+) psychiatric history alcohol abuse     Infectious Disease:  - neg infectious disease ROS     Malignancy:  - neg malignancy ROS     Other:  - neg other ROS          Physical Exam    Airway  airway exam normal      Mallampati: I   TM distance: > 3 FB   Neck ROM: full   Mouth opening: > 3 cm    Respiratory Devices and Support         Dental  no notable dental history         Cardiovascular   cardiovascular exam normal       Rhythm and rate: regular and normal     Pulmonary   pulmonary exam normal        breath sounds clear to  auscultation           OUTSIDE LABS:  CBC: No results found for: WBC, HGB, HCT, PLT  BMP:   Lab Results   Component Value Date     (H) 08/29/2022     COAGS: No results found for: PTT, INR, FIBR  POC: No results found for: BGM, HCG, HCGS  HEPATIC: No results found for: ALBUMIN, PROTTOTAL, ALT, AST, GGT, ALKPHOS, BILITOTAL, BILIDIRECT, ISIDRA  OTHER: No results found for: PH, LACT, A1C, CHRIS, PHOS, MAG, LIPASE, AMYLASE, TSH, T4, T3, CRP, SED    Anesthesia Plan    ASA Status:  3   NPO Status:  NPO Appropriate    Anesthesia Type: General.     - Airway: LMA   Induction: Intravenous, Propofol.   Maintenance: Balanced.        Consents    Anesthesia Plan(s) and associated risks, benefits, and realistic alternatives discussed. Questions answered and patient/representative(s) expressed understanding.    - Discussed:     - Discussed with:  Patient    Use of blood products discussed: No .     Postoperative Care    Pain management: Multi-modal analgesia, Oral pain medications.   PONV prophylaxis: Ondansetron (or other 5HT-3), Dexamethasone or Solumedrol     Comments:                Christopher Bolanos DO

## 2022-08-30 VITALS
BODY MASS INDEX: 38.31 KG/M2 | HEIGHT: 72 IN | TEMPERATURE: 97.7 F | DIASTOLIC BLOOD PRESSURE: 77 MMHG | OXYGEN SATURATION: 96 % | SYSTOLIC BLOOD PRESSURE: 140 MMHG | RESPIRATION RATE: 14 BRPM | WEIGHT: 282.85 LBS | HEART RATE: 66 BPM

## 2022-08-30 LAB
ANION GAP SERPL CALCULATED.3IONS-SCNC: 5 MMOL/L (ref 3–14)
BUN SERPL-MCNC: 38 MG/DL (ref 7–30)
CALCIUM SERPL-MCNC: 8.6 MG/DL (ref 8.5–10.1)
CHLORIDE BLD-SCNC: 113 MMOL/L (ref 94–109)
CO2 SERPL-SCNC: 23 MMOL/L (ref 20–32)
CREAT SERPL-MCNC: 2.2 MG/DL (ref 0.66–1.25)
ERYTHROCYTE [DISTWIDTH] IN BLOOD BY AUTOMATED COUNT: 12.5 % (ref 10–15)
GFR SERPL CREATININE-BSD FRML MDRD: 31 ML/MIN/1.73M2
GLUCOSE BLD-MCNC: 201 MG/DL (ref 70–99)
GLUCOSE BLD-MCNC: 201 MG/DL (ref 70–99)
GLUCOSE BLD-MCNC: 208 MG/DL (ref 70–99)
HCT VFR BLD AUTO: 31.6 % (ref 40–53)
HGB BLD-MCNC: 10.9 G/DL (ref 13.3–17.7)
MCH RBC QN AUTO: 32.2 PG (ref 26.5–33)
MCHC RBC AUTO-ENTMCNC: 34.5 G/DL (ref 31.5–36.5)
MCV RBC AUTO: 93 FL (ref 78–100)
PLATELET # BLD AUTO: 381 10E3/UL (ref 150–450)
POTASSIUM BLD-SCNC: 4.9 MMOL/L (ref 3.4–5.3)
RBC # BLD AUTO: 3.39 10E6/UL (ref 4.4–5.9)
SODIUM SERPL-SCNC: 141 MMOL/L (ref 133–144)
WBC # BLD AUTO: 13 10E3/UL (ref 4–11)

## 2022-08-30 PROCEDURE — 82947 ASSAY GLUCOSE BLOOD QUANT: CPT | Performed by: UROLOGY

## 2022-08-30 PROCEDURE — 250N000013 HC RX MED GY IP 250 OP 250 PS 637: Performed by: STUDENT IN AN ORGANIZED HEALTH CARE EDUCATION/TRAINING PROGRAM

## 2022-08-30 PROCEDURE — 36415 COLL VENOUS BLD VENIPUNCTURE: CPT | Performed by: UROLOGY

## 2022-08-30 PROCEDURE — 250N000009 HC RX 250: Performed by: STUDENT IN AN ORGANIZED HEALTH CARE EDUCATION/TRAINING PROGRAM

## 2022-08-30 PROCEDURE — 85014 HEMATOCRIT: CPT | Performed by: STUDENT IN AN ORGANIZED HEALTH CARE EDUCATION/TRAINING PROGRAM

## 2022-08-30 PROCEDURE — 36415 COLL VENOUS BLD VENIPUNCTURE: CPT | Performed by: STUDENT IN AN ORGANIZED HEALTH CARE EDUCATION/TRAINING PROGRAM

## 2022-08-30 PROCEDURE — 82310 ASSAY OF CALCIUM: CPT | Performed by: STUDENT IN AN ORGANIZED HEALTH CARE EDUCATION/TRAINING PROGRAM

## 2022-08-30 RX ORDER — CIPROFLOXACIN 500 MG/1
500 TABLET, FILM COATED ORAL 2 TIMES DAILY
Qty: 6 TABLET | Refills: 0 | Status: SHIPPED | OUTPATIENT
Start: 2022-08-30

## 2022-08-30 RX ADMIN — DORZOLAMIDE HYDROCHLORIDE AND TIMOLOL MALEATE 1 DROP: 20; 5 SOLUTION OPHTHALMIC at 00:27

## 2022-08-30 RX ADMIN — NIFEDIPINE 90 MG: 30 TABLET, FILM COATED, EXTENDED RELEASE ORAL at 08:05

## 2022-08-30 RX ADMIN — ACETAMINOPHEN 975 MG: 325 TABLET, FILM COATED ORAL at 00:26

## 2022-08-30 RX ADMIN — CITALOPRAM HYDROBROMIDE 40 MG: 10 TABLET ORAL at 08:06

## 2022-08-30 RX ADMIN — CIPROFLOXACIN 500 MG: 500 TABLET, FILM COATED ORAL at 08:07

## 2022-08-30 RX ADMIN — DORZOLAMIDE HYDROCHLORIDE AND TIMOLOL MALEATE 1 DROP: 20; 5 SOLUTION OPHTHALMIC at 08:14

## 2022-08-30 RX ADMIN — ACETAMINOPHEN 975 MG: 325 TABLET, FILM COATED ORAL at 08:07

## 2022-08-30 RX ADMIN — ATORVASTATIN CALCIUM 10 MG: 10 TABLET, FILM COATED ORAL at 11:06

## 2022-08-30 RX ADMIN — DOCUSATE SODIUM AND SENNOSIDES 1 TABLET: 8.6; 5 TABLET, FILM COATED ORAL at 00:26

## 2022-08-30 RX ADMIN — PREDNISOLONE ACETATE 1 DROP: 10 SUSPENSION/ DROPS OPHTHALMIC at 08:14

## 2022-08-30 RX ADMIN — DOCUSATE SODIUM AND SENNOSIDES 1 TABLET: 8.6; 5 TABLET, FILM COATED ORAL at 08:08

## 2022-08-30 ASSESSMENT — ACTIVITIES OF DAILY LIVING (ADL)
ADLS_ACUITY_SCORE: 20

## 2022-08-30 NOTE — PHARMACY-ADMISSION MEDICATION HISTORY
Admission Medication History Completed by Pharmacy    See [facility] Admission Navigator for allergy information, preferred outpatient pharmacy, prior to admission medications and immunization status.     Medication History Sources:     Patient, Sures[facility], [facility] Everywhere    Changes made to PTA medication list (reason):    Added: None    Deleted: chlorthalidone (discontinued)    Changed: added acyclovir dosing    Additional Information:    Ciprofloxacin was prescribed pta--8/24 x 7 days    Prior to Admission medications    Medication Sig Last Dose Taking? Auth Provider Long Term End Date   acyclovir (ZOVIRAX) 800 MG tablet 400 mg 2 times daily Past Week at Unknown time Yes Reported, Patient No    aspirin (ASA) 81 MG EC tablet Take 1 tablet by mouth daily 8/19/2022 at Unknown time Yes Reported, Patient     atorvastatin (LIPITOR) 20 MG tablet TAKE ONE-HALF TABLET BY MOUTH AT BEDTIME FOR CHOLESTEROL Past Week at Unknown time Yes Reported, Patient Yes    ciprofloxacin (CIPRO) 500 MG tablet Take 1 tablet (500 mg) by mouth daily for 6 days 8/29/2022 at 0700 Yes Jacek Simons MD  8/30/22   citalopram (CELEXA) 40 MG tablet Take 1 tablet by mouth every morning Past Week at Unknown time Yes Reported, Patient Yes    dorzolamide-timolol (COSOPT) 2-0.5 % ophthalmic solution Place 1 drop into both eyes 2 times daily 8/29/2022 at 0700 Yes Reported, Patient     empagliflozin (JARDIANCE) 25 MG TABS tablet TAKE ONE TABLET BY MOUTH EVERY DAY FOR DIABETES Past Week at Unknown time Yes Reported, Patient     fluocinolone (SYNALAR) 0.01 % solution APPLY SMALL TOPICALLY TWICE A DAY **EXTERNAL USE ONLY* FOR EAR RASH Past Month at Unknown time Yes Reported, Patient     folic acid (FOLVITE) 1 MG tablet Take 1 tablet by mouth daily Past Week at Unknown time Yes Reported, Patient     glipiZIDE (GLUCOTROL) 5 MG tablet 15 mg 2 times daily (before meals) Past Week at Unknown time Yes Reported, Patient Yes    losartan (COZAAR) 100 MG tablet  Take 1 tablet by mouth daily 8/29/2022 at 0800 Yes Reported, Patient Yes    mirtazapine (REMERON) 30 MG tablet TAKE ONE-HALF TABLET BY MOUTH AT BEDTIME FOR DEPRESSION Past Week at Unknown time Yes Reported, Patient Yes    NIFEdipine ER (ADALAT CC) 90 MG 24 hr tablet Take 1 tablet by mouth daily Past Week at Unknown time Yes Reported, Patient Yes    potassium chloride ER (K-TAB/KLOR-CON) 10 MEQ CR tablet Take 1 tablet by mouth daily Past Week at Unknown time Yes Reported, Patient     prednisoLONE acetate (PRED FORTE) 1 % ophthalmic suspension Place 1 drop Into the left eye daily 8/29/2022 at 0700 Yes Reported, Patient     semaglutide (OZEMPIC, 0.25 OR 0.5 MG/DOSE,) 2 MG/1.5ML SOPN pen Inject 0.5 mg Subcutaneous every 7 days Injects once a week, every Tuesday. 8/23/2022 Yes Reported, Patient     tamsulosin (FLOMAX) 0.4 MG capsule Take 0.4 mg by mouth daily Past Week at Unknown time Yes Reported, Patient     triamcinolone (KENALOG) 0.1 % external cream APPLY THIN LAYER TOPICALLY TWICE A DAY **AVOID FACE,GROIN & ARMPITS *FOR EXTERNAL USE ONLY** FOR NECK RASH Past Month at Unknown time Yes Reported, Patient     TURMERIC PO Take by mouth daily Past Week at Unknown time Yes Reported, Patient     sterile water, bottle, irrigation USE 10 ML AS NEEDED FOR CATHETER CHANGES   Reported, Patient         Date completed: 08/30/22    Medication history completed by: [place] [facility]

## 2022-08-30 NOTE — PROGRESS NOTES
Urology  Progress Note    Pulled at catheter in PACU resulting in reddening of urine  Some leaking overnight w/ BS of 0  TOV initiated this AM  Otherwise doing well, feeling well    Exam  /68 (BP Location: Left arm)   Pulse 65   Temp 97.9  F (36.6  C) (Oral)   Resp 17   Ht 1.829 m (6')   Wt 128.3 kg (282 lb 13.6 oz)   SpO2 98%   BMI 38.36 kg/m    No acute distress  Unlabored breathing  Abdomen soft, nontender, nondistended.   Mendez with clear urine in tubing on slow drip    UOP CBI    Labs  WBC 13  Hgb 10.9 (11.9)  Cr 2.09    AM labs pending    Assessment/Plan  71 year old y/o male POD#1 s/p HOLEP for BPH.     Neuro: APAP, oxycodone for pain control  CV: PTA meds  Pulm: incentive spirometry while awake  FEN/GI: regular diet  Endo: IRENE  : TOV in process, will re-evaluate in the afternoon  Heme/ID: Cipro in house  Activity: Up ad luis  PPx: SCDs  Dispo: Pending catheter plan, potential discharge later today    Seen and examined with the chief resident. Will discuss with Dr. Price.    Kevin Graves MD, PGY-3  Urology Resident     Contacting the Urology Team     Please use the following job codes to reach the Urology Team. Note that you must use an in house phone and that job codes cannot receive text pages.   On weekdays, dial [phone] (or star-star-star [phone] on the new [email or web address] telephones) then [phone] to reach the Adult Urology resident or PA on call  On weekdays, dial [phone] (or star-star-star [phone] on the new [email or web address] telephones) then [phone] to reach the Pediatric Urology resident  On weeknights and weekends, dial [phone] (or star-star-star [phone] on the new [email or web address] telephones) then [phone] to reach the Urology resident on call (for both Adult and Pediatrics)

## 2022-08-30 NOTE — PLAN OF CARE
/68 (BP Location: Left arm)   Pulse 65   Temp 97.9  F (36.6  C) (Oral)   Resp 17   Ht 1.829 m (6')   Wt 128.3 kg (282 lb 13.6 oz)   SpO2 98%   BMI 38.36 kg/m      POD#1.   A&Ox4.   On 0.5L oxygen via NC w/ SpO2 in high 90s. IS given.   R PIV infusing NS at 75.   CBI running slow - watermelon colored/no clots. Draining at insertion site and wrapped w/ kerlix.   Refused PCDs.   Denied pain.   Plan: Trial void this morning.

## 2022-08-30 NOTE — OR NURSING
Paged Dr. Huang Marti regarding leaking around catheter site and darkening of urine. It was between level III and IV at time of call ~1950. Bladder scan was 0 ml. MD came to bedside and manually irrigated several times. Also swapped out the decker bag and re-taped to pt's thigh. Color of urine has lightened substantially since irrigating and is now between level I and lI. Will continue to monitor and check a hemoglobin at 2200 per Dr. Simons.

## 2022-08-30 NOTE — OR NURSING
PACU to Inpatient Nursing Handoff    Patient Dale Wright is a 71 year old male who speaks English.   Procedure Procedure(s):  Holmium Laser Enucleation of the Prostate   Surgeon(s) Primary: Jacek Simons MD  Resident - Assisting: Huang Marti MD     Allergies   Allergen Reactions     Simvastatin        Isolation  [unfilled]     Past Medical History   has a past medical history of Arthritis, Corneal ulcer of left eye, Hypertension, Type 2 diabetes mellitus without complications (H), and Unspecified nonsenile cataract.    Anesthesia Choice   Dermatome Level     Preop Meds acetaminophen (Tylenol) - time given: 975mg @ 1244   Nerve block Not applicable   Intraop Meds dexamethasone (Decadron)  fentanyl (Sublimaze): 100 mcg total  hydromorphone (Dilaudid): 0.5 mg total  ondansetron (Zofran): last given at 4mg @ 1701  no other meds.   Local Meds No   Antibiotics cefazolin (Ancef) - last given at 1407  vancomycin (Vancocin) - last given at 1428     Pain Patient Currently in Pain: denies   PACU meds  B&O supp @ 1755 & transexamic acid 1000mg IV at 1807   PCA / epidural No   Capnography     Telemetry ECG Rhythm: Normal sinus rhythm   Inpatient Telemetry Monitor Ordered? No        Labs Glucose Lab Results   Component Value Date     08/29/2022     08/29/2022       Hgb Lab Results   Component Value Date    HGB 11.5 08/29/2022       INR No results found for: INR   PACU Imaging Not applicable     Wound/Incision     CMS        Equipment CBI in progress.   Other LDA       IV Access Peripheral IV 08/29/22 Right Upper forearm (Active)   Site Assessment WDL 08/29/22 1900   Line Status Infusing 08/29/22 1900   Phlebitis Scale 0-->no symptoms 08/29/22 1900   Infiltration Scale 0 08/29/22 1900   Number of days: 0      Blood Products Not applicable  mL   Intake/Output Date 08/29/22 0700 - 08/30/22 0659   Shift 8504-5623 0258-4088 3559-7218 24 Hour Total   INTAKE   I.V.  1000  1000   IV Piggyback 200   200    Shift Total(mL/kg) 200(1.56) 1000(7.79)  1200(9.35)   OUTPUT   Urine  -900  -900   Blood  300  300   Shift Total(mL/kg)  -600(-4.68)  -600(-4.68)   Weight (kg) 128.3 128.3 128.3 128.3      Drains / Mendez Urethral Catheter 08/29/22 Latex 22 fr (Active)   Tube Description UTV 08/29/22 1800   Collection Container Standard 08/29/22 1900   Securement Method Tape 08/29/22 1900   Rationale for Continued Use /GI/GYN Pelvic Procedure 08/29/22 1900   Number of days: 0      Time of void PreOp Void Prior to Procedure: 1100 (08/29/22 1230)    PostOp      Diapered? No   Bladder Scan     PO    nothing by mouth at present.     Vitals    B/P: (!) 150/84  T: 97.7  F (36.5  C)    Temp src: Oral  P:  Pulse: 71 (08/29/22 1915)          R: 20  O2:  SpO2: 97 %    O2 Device: None (Room air) (08/29/22 1130)    Oxygen Delivery: 2 LPM (08/29/22 1900)         Family/support present Wife/Heather took shuttle to hotel.   Patient belongings     Patient transported on cart   DC meds/scripts (obs/outpt) Not applicable   Inpatient Pain Meds Released? Yes       Special needs/considerations history of PTSD.   Tasks needing completion None       Roxana Nolasco RN  [facility] [id number]

## 2022-08-30 NOTE — DISCHARGE INSTRUCTIONS
"HoLEP    Activity  - No strenuous exercise for 6 weeks.  - No lifting, pushing, pulling more than 10 pounds for 6 weeks.  - Do not strain with bowel movements.  - Do not drive until you can press the brake pedal quickly and fully without pain.  - Do not operate a motor vehicle while taking narcotic pain medications.    Urination  Catheter removed prior to discharge  - Some redness is expected in the upcoming 7-10days.   - If having hematuria (blood in the urine), make sure to increase your water intake and monitor for improvement  - You may notice more blood in the urine with increasing physical activity. This is normal and is not a cause for concern unless the urine becomes dark maroon in color, contains clots larger than a quarter, or if you cannot urinate.   - If you are unable to urinate you should return urgently to the ED or call clinic to try to arrange for an urgent visit same day, or perform self-catheterization if unable to go to the ED immediately.    Medications  - Ciprofloxacin 3 days to help prevent infection  - Take tylenol for pain  - Take over the counter fiber (metamucil or benefiber) and stool softeners (miralax, docusate or senna) while taking narcotic pain medications, but stop if you develop diarrhea.    Follow-Up:  - Call your primary care provider to touch base regarding your recent admission.  - Call or return sooner than your regularly scheduled visit if you develop any of the following: fever (greater than 101.5), uncontrolled pain, uncontrolled nausea or vomiting, worsening blood in urine or inability to urinate as above.    Phone numbers:   - Monday through Friday 8am to 4:30pm: Call [phone] with questions, requests for medication refills, or to schedule or confirm an appointment.  - Nights or weekends: call the after hours emergency pager - [phone] and tell the  \"I would like to page the Urology Resident on call.\" Please note, due to prescribing laws, resident " physicians are unable to prescribe narcotics after-hours. If you feel as though you will need narcotic pain medications, you will need to call the clinic during business hours OR seek emergency care.  - For emergencies, call 911

## 2022-08-30 NOTE — PLAN OF CARE
Goal Outcome Evaluation:    Patient alert/oriented x4. Mendez removed. R PIV removed for discharge. No c/o pain or SOB. Discharging to home with his wife. VSS. Discharge papers/medications gone over with patient and his wife, verbalized understanding.

## 2022-08-30 NOTE — PLAN OF CARE
VSS on room air. Tolerating regular diet without activity - SL PIV as no concerns with oral intake. Denies chest pain and denies SOB. Independent in room.     Urology removed decker late this AM and did trial void and bladder scan. Retention still noted, small amounts of output each time  mL of bloody urine. Plan for urology to come back to bedside this afternoon and determine plan. Monitor output amount and color, monitor bladder scans, continue POC.

## 2022-08-31 ENCOUNTER — PRE VISIT (OUTPATIENT)
Dept: UROLOGY | Facility: CLINIC | Age: 72
End: 2022-08-31

## 2022-08-31 NOTE — ANESTHESIA POSTPROCEDURE EVALUATION
Patient: Dale Wright    Procedure: Procedure(s):  Holmium Laser Enucleation of the Prostate       Anesthesia Type:  General    Note:  Disposition: Admission   Postop Pain Control: Uneventful            Sign Out: Well controlled pain   PONV: No   Neuro/Psych: Uneventful            Sign Out: Acceptable/Baseline neuro status   Airway/Respiratory: Uneventful            Sign Out: Acceptable/Baseline resp. status   CV/Hemodynamics: Uneventful            Sign Out: Acceptable CV status   Other NRE: NONE   DID A NON-ROUTINE EVENT OCCUR? No           Last vitals:  Vitals Value Taken Time   /88 08/29/22 2200   Temp 36.7  C (98  F) 08/29/22 2015   Pulse 70 08/29/22 2203   Resp 50 08/29/22 2203   SpO2 100 % 08/29/22 2205   Vitals shown include unvalidated device data.    Electronically Signed By: Kristina Dillon MD  August 30, 2022  7:11 PM

## 2022-08-31 NOTE — TELEPHONE ENCOUNTER
Reason for visit: post op HoLEP     Dx/Hx/Sx: prostatic hypertrophy w/ retention     Records/imaging/labs/orders: in epic    At Rooming: standard

## 2022-09-02 LAB
PATH REPORT.COMMENTS IMP SPEC: NORMAL
PATH REPORT.COMMENTS IMP SPEC: NORMAL
PATH REPORT.FINAL DX SPEC: NORMAL
PATH REPORT.GROSS SPEC: NORMAL
PATH REPORT.MICROSCOPIC SPEC OTHER STN: NORMAL
PATH REPORT.RELEVANT HX SPEC: NORMAL
PHOTO IMAGE: NORMAL

## 2022-09-07 NOTE — DISCHARGE SUMMARY
Discharge Summary     Dale Wright MRN# [id number]   YOB: 1950 Age: 71 year old     Date of Admission:  8/29/2022  Date of Discharge::  8/30/2022  4:52 PM  Admitting Physician:  Jacek Simons MD  Discharge Physician:  Huang Marti MD  Primary Care Physician:         Jacek Simons          Admission Diagnoses:   Urinary retention [R33.9]  Retention of urine [R33.9]          Discharge Diagnosis:   Same as above           Procedures:   : Procedure(s):  Holmium Laser Enucleation of the Prostate        Non-operative procedures:   None performed          Consultations:   None           Imaging Studies:     Results for orders placed or performed in visit on 05/03/22   US Renal Complete    Narrative    EXAMINATION: US RENAL COMPLETE 5/3/2022 1:00 PM     COMPARISON: 12/13/2021.    HISTORY: BPH with urinary obstruction, evaluate for hydronephrosis.    TECHNIQUE: The kidneys and bladder were scanned in the standard  fashion with specialized ultrasound transducer(s) using both gray  scale and limited color/spectral Doppler techniques.    FINDINGS:    Right kidney: Measures 11.6 cm in length. Parenchyma is of normal  thickness and echogenicity. No focal mass. No hydronephrosis.    Left kidney: Measures 12.2 cm in length. Parenchyma is of normal  thickness and echogenicity. No focal mass. No hydronephrosis.     Bladder: Decompressed with Mendez catheter in place, poorly visualized.        Impression    IMPRESSION:  No hydronephrosis.    JOHN CRUZ MD         SYSTEM ID:  [id number]            Medications Prior to Admission:     No medications prior to admission.            Discharge Medications:     Discharge Medication List as of 8/30/2022  4:22 PM      CONTINUE these medications which have CHANGED    Details   ciprofloxacin (CIPRO) 500 MG tablet Take 1 tablet (500 mg) by mouth 2 times daily, Disp-6 tablet, R-0, E-Prescribe         CONTINUE these medications which have  NOT CHANGED    Details   acyclovir (ZOVIRAX) 800 MG tablet 400 mg 2 times daily, Historical      aspirin (ASA) 81 MG EC tablet Take 1 tablet by mouth daily, Historical      atorvastatin (LIPITOR) 20 MG tablet TAKE ONE-HALF TABLET BY MOUTH AT BEDTIME FOR CHOLESTEROL, Historical      citalopram (CELEXA) 40 MG tablet Take 1 tablet by mouth every morning, Historical      dorzolamide-timolol (COSOPT) 2-0.5 % ophthalmic solution Place 1 drop into both eyes 2 times daily, Historical      empagliflozin (JARDIANCE) 25 MG TABS tablet TAKE ONE TABLET BY MOUTH EVERY DAY FOR DIABETES, Historical      fluocinolone (SYNALAR) 0.01 % solution APPLY SMALL TOPICALLY TWICE A DAY **EXTERNAL USE ONLY* FOR EAR RASHHistorical      folic acid (FOLVITE) 1 MG tablet Take 1 tablet by mouth daily, Historical      glipiZIDE (GLUCOTROL) 5 MG tablet 15 mg 2 times daily (before meals), Historical      losartan (COZAAR) 100 MG tablet Take 1 tablet by mouth daily, Historical      mirtazapine (REMERON) 30 MG tablet TAKE ONE-HALF TABLET BY MOUTH AT BEDTIME FOR DEPRESSION, Historical      NIFEdipine ER (ADALAT CC) 90 MG 24 hr tablet Take 1 tablet by mouth daily, Historical      potassium chloride ER (K-TAB/KLOR-CON) 10 MEQ CR tablet Take 1 tablet by mouth daily, Historical      prednisoLONE acetate (PRED FORTE) 1 % ophthalmic suspension Place 1 drop Into the left eye daily, Historical      semaglutide (OZEMPIC, 0.25 OR 0.5 MG/DOSE,) 2 MG/1.5ML SOPN pen Inject 0.5 mg Subcutaneous every 7 days Injects once a week, every Tuesday., Historical      sterile water, bottle, irrigation USE 10 ML AS NEEDED FOR CATHETER CHANGES, Historical      tamsulosin (FLOMAX) 0.4 MG capsule Take 0.4 mg by mouth daily, Historical      triamcinolone (KENALOG) 0.1 % external cream APPLY THIN LAYER TOPICALLY TWICE A DAY **AVOID FACE,GROIN & ARMPITS *FOR EXTERNAL USE ONLY** FOR NECK RASHHistorical      TURMERIC PO Take by mouth daily, Historical                    Brief History of  Illness:   Reason for admission requiring a surgical or invasive procedure:   Urinary retention [R33.9]   The patient underwent the following procedure(s):   See above   There were no immediate complications during this procedure.    Please refer to the full operative summary for details.           Hospital Course:   The patient's hospital course was unremarkable.  Dale Wright recovered as anticipated and experienced no post-operative complications.      On POD#1 patient was ambulating without assitance, tolerating a regular diet, had pain controlled with PO medications to go home with, and requiring no IV medications or fluids. He had his CBI stopped with adequate urine character and had this catheter removed. He was then discharged after passing his trial of void. Patient was discharged home with appropriate contact information, follow-up and instructions as seen below in the discharge paperwork.         Final Pathology Result:   Pending at time of discharge         Discharge Instructions and Follow-Up:     Discharge Procedure Orders   Reason for your hospital stay   Order Comments: HOLEP            Discharge Disposition:     Discharged to Home      Condition at discharge: Good    --    Huang Marti MD  Urology Resident    5:23 AM, 9/7/2022

## 2022-10-03 ENCOUNTER — TRANSCRIBE ORDERS (OUTPATIENT)
Dept: OTHER | Age: 72
End: 2022-10-03

## 2022-10-03 DIAGNOSIS — R33.9 RETENTION OF URINE, UNSPECIFIED: Primary | ICD-10-CM

## 2022-10-04 ENCOUNTER — OFFICE VISIT (OUTPATIENT)
Dept: UROLOGY | Facility: CLINIC | Age: 72
End: 2022-10-04
Payer: COMMERCIAL

## 2022-10-04 VITALS
WEIGHT: 285 LBS | HEIGHT: 72 IN | HEART RATE: 74 BPM | BODY MASS INDEX: 38.6 KG/M2 | DIASTOLIC BLOOD PRESSURE: 75 MMHG | SYSTOLIC BLOOD PRESSURE: 130 MMHG

## 2022-10-04 DIAGNOSIS — Z98.890 HISTORY OF PROSTATE SURGERY: Primary | ICD-10-CM

## 2022-10-04 DIAGNOSIS — R33.9 RETENTION OF URINE, UNSPECIFIED: ICD-10-CM

## 2022-10-04 DIAGNOSIS — E66.01 MORBID OBESITY (H): ICD-10-CM

## 2022-10-04 PROCEDURE — 99024 POSTOP FOLLOW-UP VISIT: CPT | Performed by: NURSE PRACTITIONER

## 2022-10-04 ASSESSMENT — PAIN SCALES - GENERAL: PAINLEVEL: NO PAIN (0)

## 2022-10-04 NOTE — PROGRESS NOTES
Assessment and Plan:     Assessment: 71 year old male who is now 6-weeks post-HoLEP, with large volume of tissue removed, who is doing very well post-op. Has a strong stream and empties his bladder completely (Qmax of 30 mL/s and PVR of 45 mL today). No leakage issues. Worked on Kegels pre-op.     Plan:  -Follow up with me in 6 weeks via VV with PSA drawn beforehand. [facility] [place] Will [facility].       Luli Tilley CNP  Department of Urology           Chief Complaint:   HoLEP follow up          History of Present Illness:    Dale Wright is a 71 year old male with a history of large-gland BPH with urinary retention, now s/p HoLEP with Dr. Simons on 8/29/22, with 171 g of benign tissue removed.     Today, he states that he has been doing very well since surgery. His stream is stronger than it ever has been in his life and he feels like he is emptying his bladder completely each time. He denies any leakage issues. He met with PFPT prior to surgery and worked on Kegels consistently until surgery. He wears a Depends daily just in case, but has not had an issue.          Past Medical History:     Past Medical History:   Diagnosis Date     Arthritis      Corneal ulcer of left eye      Hypertension      Type 2 diabetes mellitus without complications (H)      Unspecified nonsenile cataract     OS            Past Surgical History:     Past Surgical History:   Procedure Laterality Date     LASER HOLMIUM ENUCLEATION PROSTATE N/A 8/29/2022    Procedure: Holmium Laser Enucleation of the Prostate;  Surgeon: Jacek Simons MD;  Location:  OR     [facility] ANESTH,CORNEAL TRANSPLANT  4/16/2013    OS            Medications     Current Outpatient Medications   Medication     acyclovir (ZOVIRAX) 800 MG tablet     aspirin (ASA) 81 MG EC tablet     atorvastatin (LIPITOR) 20 MG tablet     ciprofloxacin (CIPRO) 500 MG tablet     citalopram (CELEXA) 40 MG tablet     dorzolamide-timolol  (COSOPT) 2-0.5 % ophthalmic solution     empagliflozin (JARDIANCE) 25 MG TABS tablet     fluocinolone (SYNALAR) 0.01 % solution     folic acid (FOLVITE) 1 MG tablet     glipiZIDE (GLUCOTROL) 5 MG tablet     losartan (COZAAR) 100 MG tablet     mirtazapine (REMERON) 30 MG tablet     NIFEdipine ER (ADALAT CC) 90 MG 24 hr tablet     potassium chloride ER (K-TAB/KLOR-CON) 10 MEQ CR tablet     prednisoLONE acetate (PRED FORTE) 1 % ophthalmic suspension     semaglutide (OZEMPIC, 0.25 OR 0.5 MG/DOSE,) 2 MG/1.5ML SOPN pen     sterile water, bottle, irrigation     tamsulosin (FLOMAX) 0.4 MG capsule     triamcinolone (KENALOG) 0.1 % external cream     TURMERIC PO     No current facility-administered medications for this visit.            Allergies:   Simvastatin         Review of Systems:  From intake questionnaire   Negative 14 system review except as noted on HPI, nurse's note.         Physical Exam:   Patient is a 71 year old  male   Vitals: Blood pressure 130/75, pulse 74, height 1.829 m (6'), weight 129.3 kg (285 lb).  General Appearance Adult: Alert, no acute distress, oriented  Lungs: no respiratory distress, or pursed lip breathing  Heart: No obvious jugular venous distension present  Abdomen: soft, nontender, no organomegaly or masses, Body mass index is 38.65 kg/m .  : deferred     Uroflow and Post-Void Residual by Bladder Scan   Voided Volume: 214 mL  QMax: 30.5 mL/s  QAv.6 mL/s  PVR: 45 mL      Labs and Pathology:    I personally reviewed all applicable laboratory data and went over findings with patient  Significant for:    CBC RESULTS:  Recent Labs   Lab Test 22  0714 22  2148 22  1836   WBC 13.0*  --  14.3*   HGB 10.9* 11.9* 11.5*     --  357        BMP RESULTS:  Recent Labs   Lab Test 22  0849 22  0714 22  1836 22  1546 22  1242   NA  --  141 140  --  140   POTASSIUM  --  4.9 5.0  --  4.5   CHLORIDE  --  113* 115*  --  112*   CO2  --  23 19*  --  23    ANIONGAP  --  5 6  --  5   * 201*  201* 207* 163* 156*   BUN  --  38* 34*  --  34*   CR  --  2.20* 2.09*  --  2.27*   GFRESTIMATED  --  31* 33*  --  30*   CHRIS  --  8.6 8.6  --  9.5

## 2022-10-04 NOTE — LETTER
10/4/2022       RE: Dale Wright  [address] [place] [id number]     Dear Colleague,    Thank you for referring your patient, Dale Wright, to the [facility] [place] [facility]. Please see a copy of my visit note below.         Assessment and Plan:     Assessment: 71 year old male who is now 6-weeks post-HoLEP, with large volume of tissue removed, who is doing very well post-op. Has a strong stream and empties his bladder completely (Qmax of 30 mL/s and PVR of 45 mL today). No leakage issues. Worked on Kegels pre-op.     Plan:  -Follow up with me in 6 weeks via VV with PSA drawn beforehand. [facility] [place] Will [facility] there.       Llui Tilley, CNP  Department of Urology           Chief Complaint:   HoLEP follow up          History of Present Illness:    Dale Wright is a 71 year old male with a history of large-gland BPH with urinary retention, now s/p HoLEP with Dr. Simons on 8/29/22, with 171 g of benign tissue removed.     Today, he states that he has been doing very well since surgery. His stream is stronger than it ever has been in his life and he feels like he is emptying his bladder completely each time. He denies any leakage issues. He met with PFPT prior to surgery and worked on Kegels consistently until surgery. He wears a Depends daily just in case, but has not had an issue.          Past Medical History:     Past Medical History:   Diagnosis Date     Arthritis      Corneal ulcer of left eye      Hypertension      Type 2 diabetes mellitus without complications (H)      Unspecified nonsenile cataract     OS            Past Surgical History:     Past Surgical History:   Procedure Laterality Date     LASER HOLMIUM ENUCLEATION PROSTATE N/A 8/29/2022    Procedure: Holmium Laser Enucleation of the Prostate;  Surgeon: Jacek Simons MD;  Location:  OR     [email or web address] ANESTH,CORNEAL TRANSPLANT   2013    OS            Medications     Current Outpatient Medications   Medication     acyclovir (ZOVIRAX) 800 MG tablet     aspirin (ASA) 81 MG EC tablet     atorvastatin (LIPITOR) 20 MG tablet     ciprofloxacin (CIPRO) 500 MG tablet     citalopram (CELEXA) 40 MG tablet     dorzolamide-timolol (COSOPT) 2-0.5 % ophthalmic solution     empagliflozin (JARDIANCE) 25 MG TABS tablet     fluocinolone (SYNALAR) 0.01 % solution     folic acid (FOLVITE) 1 MG tablet     glipiZIDE (GLUCOTROL) 5 MG tablet     losartan (COZAAR) 100 MG tablet     mirtazapine (REMERON) 30 MG tablet     NIFEdipine ER (ADALAT CC) 90 MG 24 hr tablet     potassium chloride ER (K-TAB/KLOR-CON) 10 MEQ CR tablet     prednisoLONE acetate (PRED FORTE) 1 % ophthalmic suspension     semaglutide (OZEMPIC, 0.25 OR 0.5 MG/DOSE,) 2 MG/1.5ML SOPN pen     sterile water, bottle, irrigation     tamsulosin (FLOMAX) 0.4 MG capsule     triamcinolone (KENALOG) 0.1 % external cream     TURMERIC PO     No current facility-administered medications for this visit.            Allergies:   Simvastatin         Review of Systems:  From intake questionnaire   Negative 14 system review except as noted on HPI, nurse's note.         Physical Exam:   Patient is a 71 year old  male   Vitals: Blood pressure 130/75, pulse 74, height 1.829 m (6'), weight 129.3 kg (285 lb).  General Appearance Adult: Alert, no acute distress, oriented  Lungs: no respiratory distress, or pursed lip breathing  Heart: No obvious jugular venous distension present  Abdomen: soft, nontender, no organomegaly or masses, Body mass index is 38.65 kg/m .  : deferred     Uroflow and Post-Void Residual by Bladder Scan   Voided Volume: 214 mL  QMax: 30.5 mL/s  QAv.6 mL/s  PVR: 45 mL      Labs and Pathology:    I personally reviewed all applicable laboratory data and went over findings with patient  Significant for:    CBC RESULTS:  Recent Labs   Lab Test 22  0714 22  2148 22  1836   WBC  13.0*  --  14.3*   HGB 10.9* 11.9* 11.5*     --  357        BMP RESULTS:  Recent Labs   Lab Test 08/30/22  0849 08/30/22  0714 08/29/22  1836 08/29/22  1546 08/29/22  1242   NA  --  141 140  --  140   POTASSIUM  --  4.9 5.0  --  4.5   CHLORIDE  --  113* 115*  --  112*   CO2  --  23 19*  --  23   ANIONGAP  --  5 6  --  5   * 201*  201* 207* 163* 156*   BUN  --  38* 34*  --  34*   CR  --  2.20* 2.09*  --  2.27*   GFRESTIMATED  --  31* 33*  --  30*   CHRIS  --  8.6 8.6  --  9.5

## 2022-10-04 NOTE — NURSING NOTE
Chief Complaint   Patient presents with     Surgical Followup       Blood pressure 130/75, pulse 74, height 1.829 m (6'), weight 129.3 kg (285 lb). Body mass index is 38.65 kg/m .    Patient Active Problem List   Diagnosis     Posttraumatic stress disorder     Obstructive sleep apnea syndrome in adult     Obesity     Lower leg edema     Hyperlipidemia     Hereditary hemochromatosis (H)     Glaucoma     Elevated PSA     Diabetes mellitus (H)     Corneal ulcer     Benign essential hypertension     Alcohol abuse     Acute retention of urine     Acute renal failure (H)     Urinary retention     Retention of urine       Allergies   Allergen Reactions     Simvastatin        Current Outpatient Medications   Medication Sig Dispense Refill     acyclovir (ZOVIRAX) 800 MG tablet 400 mg 2 times daily       aspirin (ASA) 81 MG EC tablet Take 1 tablet by mouth daily       atorvastatin (LIPITOR) 20 MG tablet TAKE ONE-HALF TABLET BY MOUTH AT BEDTIME FOR CHOLESTEROL       ciprofloxacin (CIPRO) 500 MG tablet Take 1 tablet (500 mg) by mouth 2 times daily 6 tablet 0     citalopram (CELEXA) 40 MG tablet Take 1 tablet by mouth every morning       dorzolamide-timolol (COSOPT) 2-0.5 % ophthalmic solution Place 1 drop into both eyes 2 times daily       empagliflozin (JARDIANCE) 25 MG TABS tablet TAKE ONE TABLET BY MOUTH EVERY DAY FOR DIABETES       fluocinolone (SYNALAR) 0.01 % solution APPLY SMALL TOPICALLY TWICE A DAY **EXTERNAL USE ONLY* FOR EAR RASH       folic acid (FOLVITE) 1 MG tablet Take 1 tablet by mouth daily       glipiZIDE (GLUCOTROL) 5 MG tablet 15 mg 2 times daily (before meals)       losartan (COZAAR) 100 MG tablet Take 1 tablet by mouth daily       mirtazapine (REMERON) 30 MG tablet TAKE ONE-HALF TABLET BY MOUTH AT BEDTIME FOR DEPRESSION       NIFEdipine ER (ADALAT CC) 90 MG 24 hr tablet Take 1 tablet by mouth daily       potassium chloride ER (K-TAB/KLOR-CON) 10 MEQ CR tablet Take 1 tablet by mouth daily       prednisoLONE  acetate (PRED FORTE) 1 % ophthalmic suspension Place 1 drop Into the left eye daily       semaglutide (OZEMPIC, 0.25 OR 0.5 MG/DOSE,) 2 MG/1.5ML SOPN pen Inject 0.5 mg Subcutaneous every 7 days Injects once a week, every Tuesday.       sterile water, bottle, irrigation USE 10 ML AS NEEDED FOR CATHETER CHANGES       tamsulosin (FLOMAX) 0.4 MG capsule Take 0.4 mg by mouth daily       triamcinolone (KENALOG) 0.1 % external cream APPLY THIN LAYER TOPICALLY TWICE A DAY **AVOID FACE,GROIN & ARMPITS *FOR EXTERNAL USE ONLY** FOR NECK RASH       TURMERIC PO Take by mouth daily         Social History     Tobacco Use     Smoking status: Never Smoker     Smokeless tobacco: Never Used   Substance Use Topics     Alcohol use: Yes     Comment: 1 can of beer every week, or every other week     Drug use: Never       Austin Martin  10/4/2022  3:34 PM

## 2022-10-04 NOTE — PATIENT INSTRUCTIONS
UROLOGY CLINIC VISIT PATIENT INSTRUCTIONS    -Follow up with me in 6 weeks with a new baseline PSA drawn beforehand ([facility] [place]). Can be a virtual visit.     If you have any issues, questions or concerns in the meantime, do not hesitate to contact us at [phone] or via [employer].     Luli Tilley, CNP  Department of Urology

## 2022-11-15 ENCOUNTER — PRE VISIT (OUTPATIENT)
Dept: UROLOGY | Facility: CLINIC | Age: 72
End: 2022-11-15

## 2022-11-15 NOTE — TELEPHONE ENCOUNTER
Reason for visit: follow up     Dx/Hx/Sx: s/p HoLEP    Records/imaging/labs/orders: PSA in epic, [facility]     At Room[facility]: video visit

## 2022-12-13 ENCOUNTER — VIRTUAL VISIT (OUTPATIENT)
Dept: UROLOGY | Facility: CLINIC | Age: 72
End: 2022-12-13
Payer: COMMERCIAL

## 2022-12-13 DIAGNOSIS — Z98.890 HISTORY OF PROSTATE SURGERY: Primary | ICD-10-CM

## 2022-12-13 PROBLEM — I65.29 CAROTID ARTERY STENOSIS: Status: ACTIVE | Noted: 2022-12-13

## 2022-12-13 PROBLEM — N39.0 URINARY TRACT INFECTION, SITE NOT SPECIFIED: Status: ACTIVE | Noted: 2022-12-13

## 2022-12-13 PROBLEM — G47.33 OBSTRUCTIVE SLEEP APNEA (ADULT) (PEDIATRIC): Status: ACTIVE | Noted: 2022-12-13

## 2022-12-13 PROBLEM — R33.9 RETENTION OF URINE, UNSPECIFIED: Status: ACTIVE | Noted: 2022-12-13

## 2022-12-13 PROBLEM — E11.9 TYPE 2 DIABETES MELLITUS WITHOUT COMPLICATIONS (H): Status: ACTIVE | Noted: 2022-12-13

## 2022-12-13 PROBLEM — Z94.7 PENETRATING KERATOPLASTY GRAFT IN PLACE: Status: ACTIVE | Noted: 2022-12-13

## 2022-12-13 PROBLEM — N17.8 OTHER ACUTE KIDNEY FAILURE (H): Status: ACTIVE | Noted: 2022-12-13

## 2022-12-13 PROBLEM — R97.20 ELEVATED PROSTATE SPECIFIC ANTIGEN (PSA): Status: ACTIVE | Noted: 2022-12-13

## 2022-12-13 PROBLEM — T83.038A: Status: ACTIVE | Noted: 2022-12-13

## 2022-12-13 PROBLEM — N18.4 CHRONIC KIDNEY DISEASE, STAGE 4 (SEVERE) (H): Status: ACTIVE | Noted: 2022-12-13

## 2022-12-13 PROBLEM — I65.21 OCCLUSION AND STENOSIS OF RIGHT CAROTID ARTERY: Status: ACTIVE | Noted: 2022-12-13

## 2022-12-13 PROBLEM — Z96.0 PRESENCE OF UROGENITAL IMPLANTS: Status: ACTIVE | Noted: 2022-12-13

## 2022-12-13 PROBLEM — R91.8 OTHER NONSPECIFIC ABNORMAL FINDING OF LUNG FIELD: Status: ACTIVE | Noted: 2022-12-13

## 2022-12-13 PROBLEM — E11.8 TYPE 2 DIABETES MELLITUS WITH UNSPECIFIED COMPLICATIONS (H): Status: ACTIVE | Noted: 2022-12-13

## 2022-12-13 PROBLEM — N40.1 BENIGN PROSTATIC HYPERPLASIA WITH LOWER URINARY TRACT SYMPTOMS: Status: ACTIVE | Noted: 2022-12-13

## 2022-12-13 PROBLEM — T83.098A: Status: ACTIVE | Noted: 2022-12-13

## 2022-12-13 PROBLEM — R21 RASH AND OTHER NONSPECIFIC SKIN ERUPTION: Status: ACTIVE | Noted: 2022-12-13

## 2022-12-13 PROBLEM — J06.9 ACUTE UPPER RESPIRATORY INFECTION, UNSPECIFIED: Status: ACTIVE | Noted: 2022-12-13

## 2022-12-13 PROBLEM — R60.9 EDEMA, UNSPECIFIED: Status: ACTIVE | Noted: 2022-12-13

## 2022-12-13 PROBLEM — R30.0 DYSURIA: Status: ACTIVE | Noted: 2022-12-13

## 2022-12-13 PROBLEM — I12.9 HYPERTENSIVE CHRONIC KIDNEY DISEASE WITH STAGE 1 THROUGH STAGE 4 CHRONIC KIDNEY DISEASE, OR UNSPECIFIED CHRONIC KIDNEY DISEASE: Status: ACTIVE | Noted: 2022-12-13

## 2022-12-13 PROBLEM — F43.10 POST-TRAUMATIC STRESS DISORDER, UNSPECIFIED: Status: ACTIVE | Noted: 2022-12-13

## 2022-12-13 PROBLEM — H40.053 OCULAR HYPERTENSION, BILATERAL: Status: ACTIVE | Noted: 2022-12-13

## 2022-12-13 PROCEDURE — 99213 OFFICE O/P EST LOW 20 MIN: CPT | Mod: TEL | Performed by: NURSE PRACTITIONER

## 2022-12-13 NOTE — PROGRESS NOTES
Dale is a 72 year old who is being evaluated via a billable video visit. This was changed to telephone as we were unable to establish video connection using either [employer] or [employer].    How would you like to obtain your AVS? Mail a copy     Duration of telephone call: 11 minutes     Dale Wright complains of   Chief Complaint   Patient presents with     RECHECK     History of prostate surgery.  S/P HoLEP       Assessment/Plan:  71 year old male with large-gland BPH s/p HoLEP 12 weeks ago who continues to do well, with no issues to report. Post-op PSA not yet done but [facility] and will have done at that time. Path completely benign. He questions if he needs to continue taking Flomax and given the OK to stop this now.   -OK to stop Flomax.   -Follow up as needed going forward.     Luli Tilley, CNP  Department of Urology      Subjective:   71 year old male with a history of large-gland BPH with urinary retention, now s/p HoLEP with Dr. Simons on 8/29/22, with 171 g of benign tissue removed. I saw him for initial post-op visit on 10/4 and he was doing very well. Reported a strong stream with no leakage issues. Worked on Kegels pre-op.    Today, he states that he has continued to do well from a urinary standpoint since his surgery. He continues to void with a strong stream and feels he is emptying his bladder well. He was diagnosed with carotid artery atherosclerosis since his surgery and is being monitored for this.     He has not yet had his PSA checked post-op but [facility].

## 2022-12-13 NOTE — PATIENT INSTRUCTIONS
UROLOGY CLINIC VISIT PATIENT INSTRUCTIONS    -Ok to stop Flomax.   -Follow up with our urology team as needed going forward.     If you have any issues, questions or concerns in the meantime, do not hesitate to contact us at [phone] or via [employer].     Luli Tilley, CNP  Department of Urology

## 2022-12-13 NOTE — LETTER
12/13/2022       RE: Dale Wright  [address] [place] [id number]     Dear Colleague,    Thank you for referring your patient, Dale Wright, to the [facility] [place] at [facility]. Please see a copy of my visit note below.    Dale is a 72 year old who is being evaluated via a billable video visit. This was changed to telephone as we were unable to establish video connection using either [employer] or [employer].    How would you like to obtain your AVS? Mail a copy     Duration of telephone call: 11 minutes     Dale Wright complains of   Chief Complaint   Patient presents with     RECHECK     History of prostate surgery.  S/P HoLEP       Assessment/Plan:  71 year old male with large-gland BPH s/p HoLEP 12 weeks ago who continues to do well, with no issues to report. Post-op PSA not yet done but [facility] and will have done at that time. Path completely benign. He questions if he needs to continue taking Flomax and given the OK to stop this now.   -OK to stop Flomax.   -Follow up as needed going forward.     Luli Tilley, CNP  Department of Urology      Subjective:   71 year old male with a history of large-gland BPH with urinary retention, now s/p HoLEP with Dr. Simons on 8/29/22, with 171 g of benign tissue removed. I saw him for initial post-op visit on 10/4 and he was doing very well. Reported a strong stream with no leakage issues. Worked on Kegels pre-op.    Today, he states that he has continued to do well from a urinary standpoint since his surgery. He continues to void with a strong stream and feels he is emptying his bladder well. He was diagnosed with carotid artery atherosclerosis since his surgery and is being monitored for this.     He has not yet had his PSA checked post-op but [facility].

## 2022-12-13 NOTE — NURSING NOTE
Chief Complaint   Patient presents with     RECHECK     History of prostate surgery.  S/P HoLEP     Anni Palacios, [facility]

## (undated) DEVICE — BLADE MORCELLATOR WOLF PIRANHA 4.75X385MM 49700103

## (undated) DEVICE — TUBING IRRIG CYSTO/BLADDER SET 81" LF 2C4040

## (undated) DEVICE — DRSG ABDOMINAL 07 1/2X8" 7197D

## (undated) DEVICE — DRAPE MAYO STAND 23X54 8337

## (undated) DEVICE — BAG URINARY DRAIN 4000ML LF 153509

## (undated) DEVICE — DRSG GAUZE 4X8" NON21842

## (undated) DEVICE — SUCTION WATERBUG FLOOR PUDDLE VAC 9321

## (undated) DEVICE — SPECIMEN TRAP TISSUE CONTAINER PIRANHA 2208120

## (undated) DEVICE — SOL NACL 0.9% IRRIG 3000ML BAG 2B7477

## (undated) DEVICE — FILTER PIRANHA DISP 2228.901

## (undated) DEVICE — LINEN TOWEL PACK X5 5464

## (undated) DEVICE — SOL NACL 0.9% IRRIG 1000ML BOTTLE 2F7124

## (undated) DEVICE — SUCTION MANIFOLD NEPTUNE 2 SYS 4 PORT 0702-020-000

## (undated) DEVICE — TUBING SET THERMEDX UROLOGY SGL USE LL0006

## (undated) DEVICE — CATH FOLEY 3WAY 22FR 30ML LATEX 0167SI22

## (undated) DEVICE — SOL WATER IRRIG 1000ML BOTTLE 2F7114

## (undated) DEVICE — TAPE DURAPORE 3" SILK 1538-3

## (undated) DEVICE — SYR 50ML LL W/O NDL 309653

## (undated) DEVICE — TUBING SET PIRANHA 41702208

## (undated) DEVICE — GLOVE PROTEXIS W/NEU-THERA 7.5  2D73TE75

## (undated) DEVICE — SYR PISTON IRRIGATION 60 ML DYND20325

## (undated) DEVICE — Device

## (undated) DEVICE — CATH LASER URETERAL 7.1FRX40CM G17797  022403-7.1-40

## (undated) DEVICE — PAD CHUX UNDERPAD 30X36" P3036C

## (undated) DEVICE — LINEN GOWN X4 5410

## (undated) DEVICE — STRAP KNEE/BODY 31143004

## (undated) DEVICE — LASER FIBER HOLMIUM MOSES 550 D/F/L AC-10030120

## (undated) RX ORDER — LIDOCAINE HYDROCHLORIDE 20 MG/ML
INJECTION, SOLUTION EPIDURAL; INFILTRATION; INTRACAUDAL; PERINEURAL
Status: DISPENSED
Start: 2022-08-29

## (undated) RX ORDER — HYDROMORPHONE HYDROCHLORIDE 1 MG/ML
INJECTION, SOLUTION INTRAMUSCULAR; INTRAVENOUS; SUBCUTANEOUS
Status: DISPENSED
Start: 2022-08-29

## (undated) RX ORDER — LIDOCAINE HYDROCHLORIDE 20 MG/ML
JELLY TOPICAL
Status: DISPENSED
Start: 2022-08-29

## (undated) RX ORDER — ONDANSETRON 2 MG/ML
INJECTION INTRAMUSCULAR; INTRAVENOUS
Status: DISPENSED
Start: 2022-08-29

## (undated) RX ORDER — PROPOFOL 10 MG/ML
INJECTION, EMULSION INTRAVENOUS
Status: DISPENSED
Start: 2022-08-29

## (undated) RX ORDER — ACETAMINOPHEN 325 MG/1
TABLET ORAL
Status: DISPENSED
Start: 2022-08-29

## (undated) RX ORDER — FENTANYL CITRATE 50 UG/ML
INJECTION, SOLUTION INTRAMUSCULAR; INTRAVENOUS
Status: DISPENSED
Start: 2022-08-29

## (undated) RX ORDER — ATROPA BELLADONNA AND OPIUM 16.2; 3 MG/1; MG/1
SUPPOSITORY RECTAL
Status: DISPENSED
Start: 2022-08-29

## (undated) RX ORDER — CEFTRIAXONE SODIUM 1 G
VIAL (EA) INJECTION
Status: DISPENSED
Start: 2022-08-29